# Patient Record
Sex: MALE | Race: BLACK OR AFRICAN AMERICAN | NOT HISPANIC OR LATINO | Employment: FULL TIME | URBAN - METROPOLITAN AREA
[De-identification: names, ages, dates, MRNs, and addresses within clinical notes are randomized per-mention and may not be internally consistent; named-entity substitution may affect disease eponyms.]

---

## 2017-06-05 ENCOUNTER — TELEPHONE (OUTPATIENT)
Dept: INTERNAL MEDICINE | Facility: CLINIC | Age: 42
End: 2017-06-05

## 2017-06-05 NOTE — LETTER
June 5, 2017    Mango Jaramillo Jr.  [address] [facility] [place] [address]             [facility] [place], operated [facility] - Internal Medicine  [address]  [place] [address]  Phone: [phone]  Fax: [phone] Dear Mr. Jaramillo:    We were unable to contact you in regards to scheduling an appointment with  Dr. Ramirez.  Please contact us at [phone] so we can schedule you to see your primary care provider at your earliest convenience.        If you have any questions or concerns, please don't hesitate to call.    Sincerely,        Tess Bullard

## 2018-02-06 ENCOUNTER — PATIENT OUTREACH (OUTPATIENT)
Dept: INTERNAL MEDICINE | Facility: CLINIC | Age: 43
End: 2018-02-06

## 2018-02-06 NOTE — PROGRESS NOTES
[facility] is committed to your overall health.  To help you get the most out of each of your visits, we will review your information to make sure you are up to date on all of your recommended tests and/or procedures.       Your PCP  Everette Ramirez MD   found that you may be due for:       Health Maintenance Due   Topic Date Due    Influenza Vaccine  08/01/2017             If you have had any of the above done at another facility, please bring the records or information with you so that your record at [facility] will be complete.  If you would like to schedule any of these, please contact me.     If you are currently taking medication, please bring it with you to your appointment for review.     Also, if you have any type of Advanced Directives, please bring them with you to your office visit so we may scan them into your chart.       Thank you for Choosing [facility] for your healthcare needs.        Additional Information  If you have questions, you can email [email or web address] or call [phone]  to talk to our [facility] staff. Remember, [facility] is NOT to be used for urgent needs. For medical emergencies, dial 911.

## 2018-03-13 ENCOUNTER — PATIENT MESSAGE (OUTPATIENT)
Dept: INTERNAL MEDICINE | Facility: CLINIC | Age: 43
End: 2018-03-13

## 2018-03-13 ENCOUNTER — LAB VISIT (OUTPATIENT)
Dept: LAB | Facility: OTHER | Age: 43
End: 2018-03-13
Attending: INTERNAL MEDICINE
Payer: COMMERCIAL

## 2018-03-13 ENCOUNTER — OFFICE VISIT (OUTPATIENT)
Dept: INTERNAL MEDICINE | Facility: CLINIC | Age: 43
End: 2018-03-13
Payer: COMMERCIAL

## 2018-03-13 VITALS
HEIGHT: 70 IN | HEART RATE: 71 BPM | BODY MASS INDEX: 28.31 KG/M2 | SYSTOLIC BLOOD PRESSURE: 114 MMHG | WEIGHT: 197.75 LBS | DIASTOLIC BLOOD PRESSURE: 70 MMHG

## 2018-03-13 DIAGNOSIS — H91.93 DECREASED HEARING OF BOTH EARS: ICD-10-CM

## 2018-03-13 DIAGNOSIS — Z00.00 WELLNESS EXAMINATION: Primary | ICD-10-CM

## 2018-03-13 DIAGNOSIS — E78.2 MIXED HYPERLIPIDEMIA: Chronic | ICD-10-CM

## 2018-03-13 DIAGNOSIS — Z00.00 WELLNESS EXAMINATION: ICD-10-CM

## 2018-03-13 DIAGNOSIS — M25.511 CHRONIC RIGHT SHOULDER PAIN: ICD-10-CM

## 2018-03-13 DIAGNOSIS — G89.29 CHRONIC RIGHT SHOULDER PAIN: ICD-10-CM

## 2018-03-13 LAB
ALBUMIN SERPL BCP-MCNC: 4.2 G/DL
ALP SERPL-CCNC: 113 U/L
ALT SERPL W/O P-5'-P-CCNC: 32 U/L
ANION GAP SERPL CALC-SCNC: 8 MMOL/L
AST SERPL-CCNC: 18 U/L
BASOPHILS # BLD AUTO: 0.01 K/UL
BASOPHILS NFR BLD: 0.3 %
BILIRUB SERPL-MCNC: 0.6 MG/DL
BUN SERPL-MCNC: 8 MG/DL
CALCIUM SERPL-MCNC: 9.8 MG/DL
CHLORIDE SERPL-SCNC: 102 MMOL/L
CO2 SERPL-SCNC: 28 MMOL/L
CREAT SERPL-MCNC: 1 MG/DL
DIFFERENTIAL METHOD: ABNORMAL
EOSINOPHIL # BLD AUTO: 0.1 K/UL
EOSINOPHIL NFR BLD: 2.2 %
ERYTHROCYTE [DISTWIDTH] IN BLOOD BY AUTOMATED COUNT: 13 %
EST. GFR  (AFRICAN AMERICAN): >60 ML/MIN/1.73 M^2
EST. GFR  (NON AFRICAN AMERICAN): >60 ML/MIN/1.73 M^2
GLUCOSE SERPL-MCNC: 95 MG/DL
HCT VFR BLD AUTO: 43 %
HGB BLD-MCNC: 14.1 G/DL
LYMPHOCYTES # BLD AUTO: 2 K/UL
LYMPHOCYTES NFR BLD: 53.3 %
MCH RBC QN AUTO: 28.9 PG
MCHC RBC AUTO-ENTMCNC: 32.8 G/DL
MCV RBC AUTO: 88 FL
MONOCYTES # BLD AUTO: 0.3 K/UL
MONOCYTES NFR BLD: 7.4 %
NEUTROPHILS # BLD AUTO: 1.4 K/UL
NEUTROPHILS NFR BLD: 36.8 %
PLATELET # BLD AUTO: 329 K/UL
PMV BLD AUTO: 9.8 FL
POTASSIUM SERPL-SCNC: 4.4 MMOL/L
PROT SERPL-MCNC: 8 G/DL
RBC # BLD AUTO: 4.88 M/UL
SODIUM SERPL-SCNC: 138 MMOL/L
WBC # BLD AUTO: 3.66 K/UL

## 2018-03-13 PROCEDURE — 80053 COMPREHEN METABOLIC PANEL: CPT

## 2018-03-13 PROCEDURE — 85025 COMPLETE CBC W/AUTO DIFF WBC: CPT

## 2018-03-13 PROCEDURE — 99999 PR PBB SHADOW E&M-EST. PATIENT-LVL IV: CPT | Mod: PBBFAC,,, | Performed by: INTERNAL MEDICINE

## 2018-03-13 PROCEDURE — 99396 PREV VISIT EST AGE 40-64: CPT | Mod: S$GLB,,, | Performed by: INTERNAL MEDICINE

## 2018-03-13 PROCEDURE — 36415 COLL VENOUS BLD VENIPUNCTURE: CPT

## 2018-03-13 NOTE — PROGRESS NOTES
Subjective:       Patient ID: Mango Jaramillo Jr. is a 42 y.o. male.    Chief Complaint: Annual Exam    Pt here for annual exam. Feels well. Counseled on exercise goals. He had lipids performed at job and are stable with , , and HDL 49.     Pt c/o R shoulder pain for several months. Hurts to pull covers up or lift something. No weakness. No neck pain. Does have some pain down into R arm but the pain into arm is only the last 3 weeks. No known inciting event/trauma. Not using anything for it.       Review of Systems   Constitutional: Negative for fatigue, fever and unexpected weight change.   HENT: Negative for congestion, rhinorrhea and sore throat.    Eyes: Negative for visual disturbance.   Respiratory: Negative for cough and shortness of breath.    Cardiovascular: Negative for chest pain, palpitations and leg swelling.   Gastrointestinal: Negative for abdominal pain, blood in stool, constipation and diarrhea.   Genitourinary: Negative for difficulty urinating and dysuria.   Skin: Negative for color change and rash.   Neurological: Negative for dizziness and syncope.   Hematological: Negative for adenopathy.   Psychiatric/Behavioral: Negative for dysphoric mood.       Objective:      Physical Exam   Constitutional: He is oriented to person, place, and time. He appears well-developed and well-nourished.   HENT:   Head: Normocephalic and atraumatic.   Right Ear: External ear normal.   Left Ear: External ear normal.   Mouth/Throat: Oropharynx is clear and moist. No oropharyngeal exudate.   Eyes: Conjunctivae and EOM are normal. Pupils are equal, round, and reactive to light.   Neck: Neck supple. Carotid bruit is not present. No thyromegaly present.   Cardiovascular: Normal rate, regular rhythm, S1 normal, S2 normal, normal heart sounds and intact distal pulses.    Pulmonary/Chest: Effort normal and breath sounds normal.   Abdominal: Soft. Bowel sounds are normal. He exhibits no mass. There is no  hepatosplenomegaly. There is no tenderness.   Musculoskeletal: He exhibits no edema.        Right shoulder: He exhibits tenderness. He exhibits normal range of motion and no bony tenderness.        Left shoulder: Normal.        Right knee: Normal.        Left knee: Normal.        Cervical back: Normal.   Pain with resistance to elevation on R shoulder   Lymphadenopathy:     He has no cervical adenopathy.   Neurological: He is alert and oriented to person, place, and time. No cranial nerve deficit.   Psychiatric: He has a normal mood and affect. His behavior is normal.       Assessment:       1. Wellness examination    2. Mixed hyperlipidemia    3. Chronic right shoulder pain    4. Decreased hearing of both ears        Plan:       1. Appropriate labs  2. Ortho referral  3. Audiology referral per pt request due to subjective decrease in hearing chronically   4. Trial of aleve otc 2 tabs bid x 10 days then prn--proper use d/w pt

## 2018-03-28 ENCOUNTER — CLINICAL SUPPORT (OUTPATIENT)
Dept: OTOLARYNGOLOGY | Facility: CLINIC | Age: 43
End: 2018-03-28
Payer: COMMERCIAL

## 2018-03-28 DIAGNOSIS — H90.3 SENSORINEURAL HEARING LOSS, BILATERAL: Primary | ICD-10-CM

## 2018-03-28 PROCEDURE — 92557 COMPREHENSIVE HEARING TEST: CPT | Mod: S$GLB,,, | Performed by: AUDIOLOGIST

## 2018-03-28 PROCEDURE — 92550 TYMPANOMETRY & REFLEX THRESH: CPT | Mod: 52,S$GLB,, | Performed by: AUDIOLOGIST

## 2018-03-28 NOTE — PROGRESS NOTES
Normal to mild sensorineural hearing loss in the left ear.  Normal to moderate sensorineural hearing loss in the right ear.  Hypermobile tympanograms with present reflexes bilaterally.  Recommend otologic evaluation before discussion of hearing aids.

## 2018-04-11 ENCOUNTER — TELEPHONE (OUTPATIENT)
Dept: ORTHOPEDICS | Facility: CLINIC | Age: 43
End: 2018-04-11

## 2018-04-11 DIAGNOSIS — M25.511 RIGHT SHOULDER PAIN, UNSPECIFIED CHRONICITY: Primary | ICD-10-CM

## 2018-04-11 DIAGNOSIS — M79.601 RIGHT ARM PAIN: ICD-10-CM

## 2018-04-11 NOTE — TELEPHONE ENCOUNTER
Mango Jaramillo Jr. reminded of appointment on 4/12/18 with Dr. JOHN Flood w/time and location. Notified of need for xray before OV w/date, time, and location of appts.  Per patient right shoulder and right bicep pain.

## 2018-04-12 ENCOUNTER — OFFICE VISIT (OUTPATIENT)
Dept: ORTHOPEDICS | Facility: CLINIC | Age: 43
End: 2018-04-12
Payer: COMMERCIAL

## 2018-04-12 ENCOUNTER — HOSPITAL ENCOUNTER (OUTPATIENT)
Dept: RADIOLOGY | Facility: OTHER | Age: 43
Discharge: HOME | End: 2018-04-12
Attending: ORTHOPAEDIC SURGERY
Payer: COMMERCIAL

## 2018-04-12 VITALS
WEIGHT: 197.06 LBS | HEART RATE: 63 BPM | DIASTOLIC BLOOD PRESSURE: 83 MMHG | SYSTOLIC BLOOD PRESSURE: 130 MMHG | BODY MASS INDEX: 28.21 KG/M2 | HEIGHT: 70 IN

## 2018-04-12 DIAGNOSIS — S46.211A BICEPS MUSCLE STRAIN, RIGHT, INITIAL ENCOUNTER: ICD-10-CM

## 2018-04-12 DIAGNOSIS — M25.511 RIGHT SHOULDER PAIN, UNSPECIFIED CHRONICITY: ICD-10-CM

## 2018-04-12 DIAGNOSIS — M79.601 RIGHT ARM PAIN: ICD-10-CM

## 2018-04-12 DIAGNOSIS — M25.511 RIGHT SHOULDER PAIN, UNSPECIFIED CHRONICITY: Primary | ICD-10-CM

## 2018-04-12 DIAGNOSIS — M25.611 DECREASED RIGHT SHOULDER RANGE OF MOTION: ICD-10-CM

## 2018-04-12 PROCEDURE — 73060 X-RAY EXAM OF HUMERUS: CPT | Mod: TC,FY,RT

## 2018-04-12 PROCEDURE — 73030 X-RAY EXAM OF SHOULDER: CPT | Mod: TC,FY,RT

## 2018-04-12 PROCEDURE — 99999 PR PBB SHADOW E&M-EST. PATIENT-LVL III: CPT | Mod: PBBFAC,,, | Performed by: PHYSICIAN ASSISTANT

## 2018-04-12 PROCEDURE — 73030 X-RAY EXAM OF SHOULDER: CPT | Mod: 26,RT,, | Performed by: RADIOLOGY

## 2018-04-12 PROCEDURE — 73060 X-RAY EXAM OF HUMERUS: CPT | Mod: 26,RT,, | Performed by: RADIOLOGY

## 2018-04-12 PROCEDURE — 99203 OFFICE O/P NEW LOW 30 MIN: CPT | Mod: S$GLB,,, | Performed by: PHYSICIAN ASSISTANT

## 2018-04-12 NOTE — PROGRESS NOTES
"Subjective:      Patient ID: Mango Jaramillo Jr. is a 42 y.o. male.    Chief Complaint: Pain of the Right Shoulder      HPI  Mango Jaramillo Jr. is a right hand dominant 42 y.o. male presenting today for right shoulder and upper arm pain.  There was not a history of trauma.  Onset of symptoms began 6-9+ months ago.  He reports that his right shoulder pain has been progressive, over the past several months.  He reports 7/10 "sharp sudden" pain that is aggravated by activity such as lifting. He also reports decreased right shoulder motion. He reports difficulty putting on a jacket or pulling the covers up in bed, he also reports difficulty taking his shirt in the back of his pants.  He reports the pain is improved with rest, he is also tried stretching and reports that it does provide a little improvement in his range of motion.  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      Review of patient's allergies indicates:  No Known Allergies      No current outpatient prescriptions on file.     No current facility-administered medications for this visit.        Past Medical History:   Diagnosis Date    Hyperlipidemia     Sciatic nerve pain diagnosed last year       Past Surgical History:   Procedure Laterality Date    APPENDECTOMY      HERNIA REPAIR           Review of Systems:  Review of Systems   Constitution: Negative for chills and fever.   Skin: Negative for rash and suspicious lesions.   Musculoskeletal:        See HPI   Neurological: Negative for dizziness, headaches, light-headedness, numbness and paresthesias.   Psychiatric/Behavioral: Negative for depression. The patient is not nervous/anxious.          OBJECTIVE:     PHYSICAL EXAM:  Height: 5' 10" (177.8 cm) Weight: 89.4 kg (197 lb 1.5 oz)     Vitals:    04/12/18 0958   BP: 130/83   Pulse: 63     General    Vitals reviewed.  Constitutional: He is oriented to person, place, and time. He appears well-developed and well-nourished.   HENT:   Head: " Normocephalic and atraumatic.   Neck: Normal range of motion.   Cardiovascular: Normal rate.    Pulmonary/Chest: Effort normal. No respiratory distress.   Neurological: He is alert and oriented to person, place, and time.   Psychiatric: He has a normal mood and affect. His behavior is normal. Judgment and thought content normal.             Musculoskeletal: No scars, edema, or ecchymosis.  Nontender to palpation.  Decreased range of motion of the right shoulder compared to the left.  Right shoulder with good forward flexion and abduction, mildly decreased ER, moderately decreased IR.  Neurovascularly intact-good sensation and motor function, good capillary refill.    RADIOGRAPHS:  Right Shoulder and Humerus X-Ray, 4/12/18  FINDINGS:  No acute fracture or dislocation.  No destructive osseous lesions.  Acromioclavicular joint is intact.  There is downsloping of the acromion and narrowing of the subacromial space.  The visualized lung is clear.   Impression   Downsloping of the acromion with narrowing of the subacromial space.  No acute findings.     Comments: I have personally reviewed the imaging and I agree with the above radiologist's report.    ASSESSMENT/PLAN:   Mango was seen today for pain.    Diagnoses and all orders for this visit:    Right shoulder pain, unspecified chronicity  -     Ambulatory Referral to Physical/Occupational Therapy    Decreased right shoulder range of motion  -     Ambulatory Referral to Physical/Occupational Therapy    Biceps muscle strain, right, initial encounter  -     Ambulatory Referral to Physical/Occupational Therapy           - We talked at length about the anatomy and pathophysiology of   Encounter Diagnoses   Name Primary?    Right shoulder pain, unspecified chronicity Yes    Decreased right shoulder range of motion     Biceps muscle strain, right, initial encounter        - Discussed conservative and surgical treatment options for shoulder pain.  Patient is not interested  in injections.  - [facility]  - Discussed use of heat and ice, use of NSAIDs  - Follow-up in 8 weeks

## 2018-04-12 NOTE — LETTER
April 15, 2018      Everette Ramirez MD  [address]  [facility] [address]           [facility]  [address]  [facility] [address]  Phone: [phone]          Patient: Mango Jaramillo Jr.   MR Number: [id number]   YOB: 1975   Date of Visit: 4/12/2018       Dear Dr. Everette Ramirez:    Thank you for referring Mango Jaramillo to me for evaluation. Attached you will find relevant portions of my assessment and plan of care.    If you have questions, please do not hesitate to call me. I look forward to following Mango Jaramillo along with you.    Sincerely,        Enclosure  CC:  No Recipients    If you would like to receive this communication electronically, please contact [email or web address] or [phone] to request more information on [employer] Link access.    For providers and/or their staff who would like to refer a patient to [facility], please contact us through our one-stop-shop provider referral line, [facility], at [phone].    If you feel you have received this communication in error or would no longer like to receive these types of communications, please e-mail [email or web address]

## 2018-05-02 ENCOUNTER — OFFICE VISIT (OUTPATIENT)
Dept: OTOLARYNGOLOGY | Facility: CLINIC | Age: 43
End: 2018-05-02
Payer: COMMERCIAL

## 2018-05-02 VITALS
BODY MASS INDEX: 28.2 KG/M2 | WEIGHT: 197 LBS | DIASTOLIC BLOOD PRESSURE: 69 MMHG | SYSTOLIC BLOOD PRESSURE: 105 MMHG | HEART RATE: 64 BPM | HEIGHT: 70 IN

## 2018-05-02 DIAGNOSIS — H83.3X3 ACOUSTIC TRAUMA OF BOTH EARS: ICD-10-CM

## 2018-05-02 DIAGNOSIS — H90.3 ASYMMETRICAL SENSORINEURAL HEARING LOSS: Primary | ICD-10-CM

## 2018-05-02 DIAGNOSIS — Z82.2 FAMILY HISTORY OF HEARING LOSS: ICD-10-CM

## 2018-05-02 PROCEDURE — 99203 OFFICE O/P NEW LOW 30 MIN: CPT | Mod: S$GLB,,, | Performed by: OTOLARYNGOLOGY

## 2018-05-02 NOTE — LETTER
May 2, 2018      Everette Ramirez MD  [address]  [facility] [address]           [facility] Otorhinolaryngology  [address]  [facility] [address]  Phone: [phone]  Fax: [phone]          Patient: Mango Jaramillo Jr.   MR Number: [id number]   YOB: 1975   Date of Visit: 5/2/2018       Dear Dr. Everette Ramirez:    Thank you for referring Mango Jaramillo to me for evaluation. Attached you will find relevant portions of my assessment and plan of care.    If you have questions, please do not hesitate to call me. I look forward to following Mango Jaramillo along with you.    Sincerely,    Nayeli Gardner MD    Enclosure  CC:  No Recipients    If you would like to receive this communication electronically, please contact [email or web address] or [phone] to request more information on [employer] Link access.    For providers and/or their staff who would like to refer a patient to [facility], please contact us through our one-stop-shop provider referral line, [facility] [place], at [phone].    If you feel you have received this communication in error or would no longer like to receive these types of communications, please e-mail [email or web address]

## 2018-05-03 NOTE — PROGRESS NOTES
Subjective:       Patient ID: Mango Jaramillo Jr. is a 43 y.o. male.    Chief Complaint: Hearing Loss    He is a new patient for me here today complaining of difficulty hearing with gradual onset over the past 1-2 years.  He states [place] as his second job and wears earmuffs for protection.  He also occasionally uses a drill during the course of his day job.  He gives a history of one set of PE tubes as a young child.  He is not aware of any difficulty hearing during his school age years or ear infections as an adult.  He flew in November without ear complaints but also reports flying to [place] a couple years ago with some difficulty clearing his ears for several hours after landing.  He denies nasal congestion or hayfever symptoms or frequent URI.  He reports a family history of hearing loss in his father who wore hearing aids as long as the patient can remember.  He is unclear as to the nature or cause of his father's hearing loss.  He denies other family members with ear or hearing problems.  There are no other otolaryngologic complaints.  He recently underwent audiologic testing and was referred due to hearing loss with some asymmetry.          Review of Systems   Ears: Positive for hearing loss and family history of hearing loss.  Negative for ear pain, ear pressure, ringing in ear, ear discharge, ear infections, dizziness, head trauma and taken gentramycin/streptomycin.    Nose:  Negative for nosebleeds, nasal obstruction, nasal or sinus surgery, loss of smell, postnasal drip and snoring.    Mouth/Throat: Negative for pain swallowing, impaired swallowing, hoarse voice, throat mass, neck mass, oral ulcers and neck lumps.   Constitutional: Negative for recent unexplained weight loss, fever, chills and night sweats.    Eyes:  Negative for history of glaucoma.   Cardiovascular:  Negative for chest pain, palpitations and history of high blood pressure.   Respiratory:  Negative for asthma,  emphysema, history of tuberculosis, recent cough and shortness of breath.    Gastrointestinal:  Negative for history of stomach ulcers or pain, acid reflux, indigestion, blood in stool and change in stool.   Other:  Negative for kidney problem, bladder problem, prostate disease, arthritis, new or changing moles, weakness, disturbances in coordination, slurred, confusion, swollen glands, anemia and persistent infections.           Objective:        Vitals:    05/02/18 1111   BP: 105/69   Pulse: 64     Body mass index is 28.27 kg/m².  Physical Exam   Constitutional: He is oriented to person, place, and time. He appears well-developed and well-nourished. No distress.   HENT:   Head: Normocephalic and atraumatic.   Right Ear: Tympanic membrane, external ear and ear canal normal.   Left Ear: Tympanic membrane, external ear and ear canal normal.   Nose: No mucosal edema, rhinorrhea or nasal deformity. No epistaxis.   Mouth/Throat: Uvula is midline, oropharynx is clear and moist and mucous membranes are normal. No oral lesions. No trismus in the jaw. No uvula swelling. No oropharyngeal exudate, posterior oropharyngeal edema or posterior oropharyngeal erythema.   Eyes: Conjunctivae are normal. Right eye exhibits no discharge. Left eye exhibits no discharge. No scleral icterus.   Neck: Normal range of motion and phonation normal. Neck supple. No tracheal deviation present. No thyromegaly present.   Pulmonary/Chest: Effort normal. No stridor. No respiratory distress.   Musculoskeletal: Normal range of motion. He exhibits no edema or deformity.   Lymphadenopathy:     He has no cervical adenopathy.   Neurological: He is alert and oriented to person, place, and time. He displays no weakness. Coordination normal.   Skin: Skin is warm and dry. He is not diaphoretic.   Psychiatric: He has a normal mood and affect. His behavior is normal. His speech is not slurred.       Tests / Results:                Assessment:       1.  Asymmetrical sensorineural hearing loss    2. Acoustic trauma of both ears    3. Family history of hearing loss        Plan:       Reviewed above history and exam and audiogram with patient and considerations and recommendations.  MRI discussed / ordered.  Reviewed and understands hearing protection.  Follow-up after above in the next few weeks.

## 2019-05-03 ENCOUNTER — LAB VISIT (OUTPATIENT)
Dept: LAB | Facility: OTHER | Age: 44
End: 2019-05-03
Attending: INTERNAL MEDICINE
Payer: COMMERCIAL

## 2019-05-03 ENCOUNTER — OFFICE VISIT (OUTPATIENT)
Dept: INTERNAL MEDICINE | Facility: CLINIC | Age: 44
End: 2019-05-03
Payer: COMMERCIAL

## 2019-05-03 VITALS
SYSTOLIC BLOOD PRESSURE: 110 MMHG | WEIGHT: 188.94 LBS | BODY MASS INDEX: 27.05 KG/M2 | HEART RATE: 68 BPM | HEIGHT: 70 IN | DIASTOLIC BLOOD PRESSURE: 70 MMHG

## 2019-05-03 DIAGNOSIS — Z00.00 WELLNESS EXAMINATION: ICD-10-CM

## 2019-05-03 DIAGNOSIS — E78.2 MIXED HYPERLIPIDEMIA: Chronic | ICD-10-CM

## 2019-05-03 DIAGNOSIS — Z00.00 WELLNESS EXAMINATION: Primary | ICD-10-CM

## 2019-05-03 LAB
ALBUMIN SERPL BCP-MCNC: 3.9 G/DL (ref 3.5–5.2)
ALP SERPL-CCNC: 99 U/L (ref 55–135)
ALT SERPL W/O P-5'-P-CCNC: 31 U/L (ref 10–44)
ANION GAP SERPL CALC-SCNC: 9 MMOL/L (ref 8–16)
AST SERPL-CCNC: 17 U/L (ref 10–40)
BASOPHILS # BLD AUTO: 0.03 K/UL (ref 0–0.2)
BASOPHILS NFR BLD: 0.9 % (ref 0–1.9)
BILIRUB SERPL-MCNC: 0.5 MG/DL (ref 0.1–1)
BUN SERPL-MCNC: 11 MG/DL (ref 6–20)
CALCIUM SERPL-MCNC: 9.6 MG/DL (ref 8.7–10.5)
CHLORIDE SERPL-SCNC: 105 MMOL/L (ref 95–110)
CHOLEST SERPL-MCNC: 230 MG/DL (ref 120–199)
CHOLEST/HDLC SERPL: 4.5 {RATIO} (ref 2–5)
CO2 SERPL-SCNC: 27 MMOL/L (ref 23–29)
CREAT SERPL-MCNC: 0.9 MG/DL (ref 0.5–1.4)
DIFFERENTIAL METHOD: ABNORMAL
EOSINOPHIL # BLD AUTO: 0.1 K/UL (ref 0–0.5)
EOSINOPHIL NFR BLD: 1.7 % (ref 0–8)
ERYTHROCYTE [DISTWIDTH] IN BLOOD BY AUTOMATED COUNT: 12.9 % (ref 11.5–14.5)
EST. GFR  (AFRICAN AMERICAN): >60 ML/MIN/1.73 M^2
EST. GFR  (NON AFRICAN AMERICAN): >60 ML/MIN/1.73 M^2
GLUCOSE SERPL-MCNC: 83 MG/DL (ref 70–110)
HCT VFR BLD AUTO: 42.8 % (ref 40–54)
HDLC SERPL-MCNC: 51 MG/DL (ref 40–75)
HDLC SERPL: 22.2 % (ref 20–50)
HGB BLD-MCNC: 14.1 G/DL (ref 14–18)
LDLC SERPL CALC-MCNC: 152.6 MG/DL (ref 63–159)
LYMPHOCYTES # BLD AUTO: 1.7 K/UL (ref 1–4.8)
LYMPHOCYTES NFR BLD: 49.9 % (ref 18–48)
MCH RBC QN AUTO: 28.6 PG (ref 27–31)
MCHC RBC AUTO-ENTMCNC: 32.9 G/DL (ref 32–36)
MCV RBC AUTO: 87 FL (ref 82–98)
MONOCYTES # BLD AUTO: 0.3 K/UL (ref 0.3–1)
MONOCYTES NFR BLD: 7.6 % (ref 4–15)
NEUTROPHILS # BLD AUTO: 1.4 K/UL (ref 1.8–7.7)
NEUTROPHILS NFR BLD: 39.6 % (ref 38–73)
NONHDLC SERPL-MCNC: 179 MG/DL
PLATELET # BLD AUTO: 309 K/UL (ref 150–350)
PMV BLD AUTO: 9.7 FL (ref 9.2–12.9)
POTASSIUM SERPL-SCNC: 4 MMOL/L (ref 3.5–5.1)
PROT SERPL-MCNC: 7.6 G/DL (ref 6–8.4)
RBC # BLD AUTO: 4.93 M/UL (ref 4.6–6.2)
SODIUM SERPL-SCNC: 141 MMOL/L (ref 136–145)
TRIGL SERPL-MCNC: 132 MG/DL (ref 30–150)
WBC # BLD AUTO: 3.43 K/UL (ref 3.9–12.7)

## 2019-05-03 PROCEDURE — 99396 PR PREVENTIVE VISIT,EST,40-64: ICD-10-PCS | Mod: S$GLB,,, | Performed by: INTERNAL MEDICINE

## 2019-05-03 PROCEDURE — 80061 LIPID PANEL: CPT

## 2019-05-03 PROCEDURE — 99396 PREV VISIT EST AGE 40-64: CPT | Mod: S$GLB,,, | Performed by: INTERNAL MEDICINE

## 2019-05-03 PROCEDURE — 80053 COMPREHEN METABOLIC PANEL: CPT

## 2019-05-03 PROCEDURE — 85025 COMPLETE CBC W/AUTO DIFF WBC: CPT

## 2019-05-03 PROCEDURE — 99999 PR PBB SHADOW E&M-EST. PATIENT-LVL III: ICD-10-PCS | Mod: PBBFAC,,, | Performed by: INTERNAL MEDICINE

## 2019-05-03 PROCEDURE — 36415 COLL VENOUS BLD VENIPUNCTURE: CPT

## 2019-05-03 PROCEDURE — 99999 PR PBB SHADOW E&M-EST. PATIENT-LVL III: CPT | Mod: PBBFAC,,, | Performed by: INTERNAL MEDICINE

## 2019-05-03 NOTE — PROGRESS NOTES
Subjective:       Patient ID: Mango Jaramillo Jr. is a 44 y.o. male.    Chief Complaint: Annual Exam    Pt here for annual exam. Counseled on exercise goals.          Review of Systems   Constitutional: Negative for fatigue, fever and unexpected weight change.   HENT: Negative for congestion, rhinorrhea and sore throat.    Eyes: Negative for visual disturbance.   Respiratory: Negative for cough and shortness of breath.    Cardiovascular: Negative for chest pain, palpitations and leg swelling.   Gastrointestinal: Negative for abdominal pain, blood in stool, constipation and diarrhea.   Genitourinary: Negative for difficulty urinating and dysuria.   Skin: Negative for color change and rash.   Neurological: Negative for dizziness and syncope.   Hematological: Negative for adenopathy.   Psychiatric/Behavioral: Negative for dysphoric mood.       Objective:      Physical Exam   Constitutional: He is oriented to person, place, and time. He appears well-developed and well-nourished.   HENT:   Head: Normocephalic and atraumatic.   Right Ear: External ear normal.   Left Ear: External ear normal.   Mouth/Throat: Oropharynx is clear and moist. No oropharyngeal exudate.   Eyes: Pupils are equal, round, and reactive to light. Conjunctivae and EOM are normal.   Neck: Neck supple. Carotid bruit is not present. No thyromegaly present.   Cardiovascular: Normal rate, regular rhythm, S1 normal, S2 normal, normal heart sounds and intact distal pulses.   Pulmonary/Chest: Effort normal and breath sounds normal.   Abdominal: Soft. Bowel sounds are normal. He exhibits no mass. There is no hepatosplenomegaly. There is no tenderness.   Musculoskeletal: He exhibits no edema.   Lymphadenopathy:     He has no cervical adenopathy.   Neurological: He is alert and oriented to person, place, and time. No cranial nerve deficit.   Psychiatric: He has a normal mood and affect. His behavior is normal.       Assessment:       1. Wellness  examination    2. Mixed hyperlipidemia        Plan:       1. Appropriate labs

## 2019-05-16 ENCOUNTER — OFFICE VISIT (OUTPATIENT)
Dept: PODIATRY | Facility: CLINIC | Age: 44
End: 2019-05-16
Payer: COMMERCIAL

## 2019-05-16 ENCOUNTER — PATIENT MESSAGE (OUTPATIENT)
Dept: PODIATRY | Facility: CLINIC | Age: 44
End: 2019-05-16

## 2019-05-16 VITALS
DIASTOLIC BLOOD PRESSURE: 69 MMHG | HEART RATE: 64 BPM | WEIGHT: 191.81 LBS | BODY MASS INDEX: 27.46 KG/M2 | HEIGHT: 70 IN | SYSTOLIC BLOOD PRESSURE: 104 MMHG | RESPIRATION RATE: 18 BRPM

## 2019-05-16 DIAGNOSIS — M72.2 PLANTAR FASCIITIS: Primary | ICD-10-CM

## 2019-05-16 PROCEDURE — 99999 PR PBB SHADOW E&M-EST. PATIENT-LVL III: ICD-10-PCS | Mod: PBBFAC,,, | Performed by: PODIATRIST

## 2019-05-16 PROCEDURE — 99999 PR PBB SHADOW E&M-EST. PATIENT-LVL III: CPT | Mod: PBBFAC,,, | Performed by: PODIATRIST

## 2019-05-16 PROCEDURE — 99203 OFFICE O/P NEW LOW 30 MIN: CPT | Mod: S$GLB,,, | Performed by: PODIATRIST

## 2019-05-16 PROCEDURE — 99203 PR OFFICE/OUTPT VISIT, NEW, LEVL III, 30-44 MIN: ICD-10-PCS | Mod: S$GLB,,, | Performed by: PODIATRIST

## 2019-05-16 NOTE — LETTER
May 16, 2019      Mango Jaramillo  [address] [facility] [place] [address]             [facility] - Podiatry  [address]  [facility] [address]  Phone: [phone] Patient: Mango Jaramillo  MRN: [id number]  YOB: 1975  Date of Visit: 05/16/2019    To Whom It May Concern:    Mango Swanson was seen in the Podiatry clinic on 05/16/2019. He may return to work/school on 5/16/19 with no restrictions. If you have any questions or concerns, or if I can be of further assistance, please do not hesitate to contact my office.    Sincerely,          Viry Garcia DPM  Podiatry Clinic  [facility]

## 2019-05-20 NOTE — PROGRESS NOTES
Subjective:      Patient ID: Mango Jaramillo Jr. is a 44 y.o. male.    Chief Complaint: PCP (Everette Ramirez ); Foot Problem (both feet ); Foot Pain (RT is worse Arch ); and Heel Pain    Mango is a 44 y.o. male who presents to the clinic complaining of heel pain in both feet, especially with the first step in the morning. The pain is described as Dull and Deep. The onset of the pain was gradual and has significantly improved over the past several days. He denies a history of trauma. Prior treatments include stretsching.      Review of Systems   Constitution: Negative for chills, decreased appetite and fever.   Cardiovascular: Negative for leg swelling.   Musculoskeletal: Negative for arthritis, joint pain, joint swelling and myalgias.   Gastrointestinal: Negative for nausea and vomiting.   Neurological: Negative for loss of balance, numbness and paresthesias.           Patient Active Problem List   Diagnosis    Mixed hyperlipidemia       No current outpatient medications on file prior to visit.     No current facility-administered medications on file prior to visit.        Review of patient's allergies indicates:  No Known Allergies    Past Surgical History:   Procedure Laterality Date    APPENDECTOMY      HERNIA REPAIR         Family History   Problem Relation Age of Onset    Heart disease Father         CABG    Heart disease Mother         CHF       Social History     Socioeconomic History    Marital status:      Spouse name: Not on file    Number of children: Not on file    Years of education: Not on file    Highest education level: Not on file   Occupational History    Not on file   Social Needs    Financial resource strain: Not on file    Food insecurity:     Worry: Not on file     Inability: Not on file    Transportation needs:     Medical: Not on file     Non-medical: Not on file   Tobacco Use    Smoking status: Never Smoker    Smokeless tobacco: Never Used   Substance and  "Sexual Activity    Alcohol use: No    Drug use: No    Sexual activity: Yes     Partners: Female   Lifestyle    Physical activity:     Days per week: Not on file     Minutes per session: Not on file    Stress: Not on file   Relationships    Social connections:     Talks on phone: Not on file     Gets together: Not on file     Attends [employer] service: Not on file     Active member of club or organization: Not on file     Attends meetings of clubs or organizations: Not on file     Relationship status: Not on file   Other Topics Concern    Not on file   Social History Narrative    Not on file               Objective:       Vitals:    05/16/19 1101   BP: 104/69   Pulse: 64   Resp: 18   Weight: 87 kg (191 lb 12.8 oz)   Height: 5' 10" (1.778 m)   PainSc: 0-No pain        Physical Exam   Constitutional: He is oriented to person, place, and time. He appears well-developed and well-nourished.   Cardiovascular: Intact distal pulses.   dorsalis pedis and posterior tibial pulses are palpable bilaterally. Capillary refill time is within normal limits. + pedal hair growth          Musculoskeletal: Normal range of motion. He exhibits tenderness (b/l feet R>L). He exhibits no edema.   Adequate joint range of motion without pain, limitation, nor crepitation Bilateral feet and ankle joints. Muscle strength is 5/5 in all groups bilaterally.         Pain on palpation of b/l  plantar heel consistent with location of patient's chief complaint. Negative Tinel's sign. No pain with lateral compression of heels.     Neurological: He is alert and oriented to person, place, and time. He has normal strength. No sensory deficit.   Loda-Haley 5.07 monofilament is intact bilateral feet.      Skin: Skin is warm, dry and intact. No lesion and no rash noted. No erythema.   Psychiatric: He has a normal mood and affect. His behavior is normal.   Vitals reviewed.            Assessment:       Encounter Diagnosis   Name Primary?    Plantar " fasciitis Yes         Plan:       Mango was seen today for pcp, foot problem, foot pain and heel pain.    Diagnoses and all orders for this visit:    Plantar fasciitis  -     ORTHOTIC DEVICE (DME)      I counseled the patient on his conditions, their implications and medical management.      Discussed different treatment options for heel pain. including conservative and interventional.  I gave written and verbal instructions on heel cord stretching and this was demonstrated for the patient. Patient expressed understanding. Discussed wearing appropriate shoe gear and avoiding flats, slippers, sandals, barefoot, and sockfeet. Recommended arch supports. My recommendation for OTC supports is Spenco polysorb replacement insoles or patient may elect more aggressive treatment with prescription arch supports. We also discussed cortisone injections and NSAID therapy.    Patient instructed on adequate icing techniques. Patient should ice the affected area at least once per day x 10 minutes for 10 days . I advised the  patient that extra icing would also be beneficial to ensure adequate anti inflammatory effect     Stretching handout dispensed to patient. Instructions on adequate stretching reviewed in clinic      Rx Custom orthotics   .

## 2019-06-06 ENCOUNTER — TELEPHONE (OUTPATIENT)
Dept: PODIATRY | Facility: CLINIC | Age: 44
End: 2019-06-06

## 2019-06-06 NOTE — TELEPHONE ENCOUNTER
----- Message from Jada Irizarry sent at 6/6/2019 11:54 AM CDT -----  Contact: [employer]  Needs Advice    Reason for call: Pt's claim have been resubmitted for processing visit 5/16        Communication Preference: Phone     Additional Information: n/a

## 2019-06-06 NOTE — TELEPHONE ENCOUNTER
Patient has yet to get a BMP prior to her follow up with Dr. Ayers on 7/9/2018. Lab order faxed over to [facility] with directions to fax results.    Spoke with patient they didn;t know what else the insurance co needs I advise they will contact our office if any thing further is needed

## 2020-03-26 ENCOUNTER — PATIENT MESSAGE (OUTPATIENT)
Dept: INTERNAL MEDICINE | Facility: CLINIC | Age: 45
End: 2020-03-26

## 2020-03-26 NOTE — TELEPHONE ENCOUNTER
Patient not taking anything for headache and cough. No fever. Patient not taking anything out of fear for making the COVID19 virus worse if he has it.

## 2020-03-30 ENCOUNTER — PATIENT MESSAGE (OUTPATIENT)
Dept: ADMINISTRATIVE | Facility: HOSPITAL | Age: 45
End: 2020-03-30

## 2020-03-31 ENCOUNTER — PATIENT MESSAGE (OUTPATIENT)
Dept: INTERNAL MEDICINE | Facility: CLINIC | Age: 45
End: 2020-03-31

## 2020-03-31 ENCOUNTER — OFFICE VISIT (OUTPATIENT)
Dept: INTERNAL MEDICINE | Facility: CLINIC | Age: 45
End: 2020-03-31
Payer: COMMERCIAL

## 2020-03-31 DIAGNOSIS — R05.9 COUGH: Primary | ICD-10-CM

## 2020-03-31 PROCEDURE — 99213 PR OFFICE/OUTPT VISIT, EST, LEVL III, 20-29 MIN: ICD-10-PCS | Mod: 95,,, | Performed by: FAMILY MEDICINE

## 2020-03-31 PROCEDURE — 99213 OFFICE O/P EST LOW 20 MIN: CPT | Mod: 95,,, | Performed by: FAMILY MEDICINE

## 2020-03-31 NOTE — PROGRESS NOTES
The antibiotic Subjective:       Patient ID: Mango Jaramillo Jr. is a 44 y.o. male.    Chief Complaint: No chief complaint on file.    HPI  45 y/o male here with cough.    He has had a cough for the past week, he reports he lost his sense of smell on Sat and decreased taste. His cough is mostly dry sometime productive of clear sputum, a mild headache denies sinus sx/sob/f/body aches, he has had a little nausea, he denies d/abdominal pain/cp/urinary sx. He is sleeping ok. He took dayquil yesterday which was helpful, he took Ibuprofen 2 days ago. [place] , no known sick contacts.  He has been staying home since Sunday. He is active and staying busy.     Review of Systems  see HPI  Objective:      There were no vitals taken for this visit.  Physical Exam   Constitutional: He is oriented to person, place, and time. He appears well-developed and well-nourished. No distress.   HENT:   Head: Normocephalic and atraumatic.   Pulmonary/Chest: No respiratory distress.   Neurological: He is alert and oriented to person, place, and time.   Skin: He is not diaphoretic.   Psychiatric: He has a normal mood and affect.       Assessment:       1. Cough        Plan:   Diagnoses and all orders for this visit:    Cough    We will discussed that he did not meet the [facility] criteria for COVID-19 testing at this time.  He will try to get tested at [facility] or [facility]. He will let us know if anything changes.  For    The patient location is: [place]  The chief complaint leading to consultation is: cough  Visit type: Virtual visit with synchronous audio and video  Total time spent with patient: 15 min  Each patient to whom he or she provides medical services by telemedicine is:  (1) informed of the relationship between the physician and patient and the respective role of any other health care provider with respect to management of the patient; and (2) notified that he or she may decline to receive  medical services by telemedicine and may withdraw from such care at any time.

## 2020-04-08 ENCOUNTER — PATIENT MESSAGE (OUTPATIENT)
Dept: INTERNAL MEDICINE | Facility: CLINIC | Age: 45
End: 2020-04-08

## 2020-04-08 NOTE — TELEPHONE ENCOUNTER
Per Dr. Ramirez, the patient can try calling the public [facility] office at [phone].  Their office hours are Mon-Fri 8am-4:30pm.  Otherwise, I do not know how to obtain any record of this result myself.

## 2020-04-08 NOTE — TELEPHONE ENCOUNTER
Patients job is requesting a record of hs positive result for COVID-19. Can we create a letter for him, or do you know of a way for him to get his results from the NON-[facility] testing site.   Please advise

## 2020-04-15 ENCOUNTER — OFFICE VISIT (OUTPATIENT)
Dept: INTERNAL MEDICINE | Facility: CLINIC | Age: 45
End: 2020-04-15
Payer: COMMERCIAL

## 2020-04-15 ENCOUNTER — PATIENT MESSAGE (OUTPATIENT)
Dept: INTERNAL MEDICINE | Facility: CLINIC | Age: 45
End: 2020-04-15

## 2020-04-15 ENCOUNTER — TELEPHONE (OUTPATIENT)
Dept: INTERNAL MEDICINE | Facility: CLINIC | Age: 45
End: 2020-04-15

## 2020-04-15 DIAGNOSIS — B34.2 CORONAVIRUS INFECTION: Primary | ICD-10-CM

## 2020-04-15 DIAGNOSIS — R05.9 COUGH: ICD-10-CM

## 2020-04-15 PROCEDURE — 99213 OFFICE O/P EST LOW 20 MIN: CPT | Mod: 95,,, | Performed by: FAMILY MEDICINE

## 2020-04-15 PROCEDURE — 99213 PR OFFICE/OUTPT VISIT, EST, LEVL III, 20-29 MIN: ICD-10-PCS | Mod: 95,,, | Performed by: FAMILY MEDICINE

## 2020-04-15 RX ORDER — BENZONATATE 200 MG/1
200 CAPSULE ORAL 3 TIMES DAILY PRN
Qty: 30 CAPSULE | Refills: 0 | Status: SHIPPED | OUTPATIENT
Start: 2020-04-15 | End: 2020-04-25

## 2020-04-15 NOTE — PROGRESS NOTES
Subjective:       Patient ID: Mango Jaramillo Jr. is a 44 y.o. male.    Chief Complaint: No chief complaint on file.    HPI  43 y/o male here to follow up with cough, he tested positive for Coronavirus on 4/2 at [facility], symptoms started 3/23/20.     He is feeling a little better, his cough has improved some, its mostly dry, his appetite has improved, his sense of smell and taste has improved. He is less tired overall. His headaches have improved, no headache over the past few days, he is not taking any otc medication, he denies f/n/v/d/constipation/cp/sob/urinary sx. He feels he coughs more when he is having a long conversation. He is sleeping ok. He is trying to get up and walk around, he is doing some walks outside. He is working from home for [employer], [place], he will need a work note.    Review of Systems  see HPI  Objective:      There were no vitals taken for this visit.  Physical Exam   Constitutional: He is oriented to person, place, and time. He appears well-developed and well-nourished. No distress.   HENT:   Head: Normocephalic and atraumatic.   Pulmonary/Chest: No respiratory distress.   Neurological: He is alert and oriented to person, place, and time.   Skin: He is not diaphoretic.   Psychiatric: He has a normal mood and affect.       Assessment:       1. Coronavirus infection    2. Cough        Plan:   Diagnoses and all orders for this visit:    Coronavirus infection  -     benzonatate (TESSALON) 200 MG capsule; Take 1 capsule (200 mg total) by mouth 3 (three) times daily as needed for Cough.  -     COVID-19 Respiratory Check; Future    Cough  -     benzonatate (TESSALON) 200 MG capsule; Take 1 capsule (200 mg total) by mouth 3 (three) times daily as needed for Cough.  -     COVID-19 Respiratory Check; Future    follow up with PCP in late May with labs prior    The patient location is: [place]  The chief complaint leading to consultation is: cough  Visit type:  audiovisual  Total time spent with patient: 15 min  Each patient to whom he or she provides medical services by telemedicine is:  (1) informed of the relationship between the physician and patient and the respective role of any other health care provider with respect to management of the patient; and (2) notified that he or she may decline to receive medical services by telemedicine and may withdraw from such care at any time.

## 2020-04-15 NOTE — TELEPHONE ENCOUNTER
Tried to reach pt, but got no answer and VM was not set up. Need to schedule pt appt with pcp in late May and schedule respiratory check. Also need to do work note for pt 3 jobs and mail them to him.

## 2020-04-15 NOTE — LETTER
April 15, 2020      [facility]  [address], [address]  [place] [address]  Phone: [phone]  Fax: [phone]       Patient: Mango Jaramillo   YOB: 1975  Date of Visit: 04/15/2020     Mango Jaramillo Jr. was in contact with/seen in my office on 04/15/2020. COVID-19 is present in our communities across the state. There is limited testing for COVID at this time, so not all patients can be tested. In this situation, your employee meets the following criteria:     Mango Jaramillo Jr. has met the criteria for COVID-19 testing and has a POSITIVE result. He should self-isolate until the followin days after onset of symptoms,   AND   Symptom improvement,   AND   72 hours without fever (and not taking fever-reducing medication)       He may return to work/school on 20 with no restrictions. If you have any questions or concerns, or if I can be of further assistance, please do not hesitate to contact me.    Sincerely,    Jeri Mejia MD

## 2020-04-15 NOTE — TELEPHONE ENCOUNTER
I spoke with patient and scheduled vitals check and follow up with PCP. Patient is okay with having return to work note sent through patient portal. Please advise when it is okay for him to return to work/send letter to patient. Pt requesting Sunday or later.

## 2020-04-15 NOTE — TELEPHONE ENCOUNTER
----- Message from Ernesto Moser sent at 4/15/2020 11:52 AM CDT -----  Contact: CRIS OLIVIA JR. [[id number]]  Type:  Patient Returning Call    Who Called: CRIS OLIVIA JR. [[id number]]    Who Left Message for Patient: Ema    Does the patient know what this is regarding?: yes    Would the patient rather a call back or a response via [facility]?  call    Best Call Back Number: [phone]    Additional Information:

## 2020-04-29 ENCOUNTER — PATIENT MESSAGE (OUTPATIENT)
Dept: INTERNAL MEDICINE | Facility: CLINIC | Age: 45
End: 2020-04-29

## 2020-04-30 ENCOUNTER — PATIENT MESSAGE (OUTPATIENT)
Dept: INTERNAL MEDICINE | Facility: CLINIC | Age: 45
End: 2020-04-30

## 2020-05-27 ENCOUNTER — OFFICE VISIT (OUTPATIENT)
Dept: INTERNAL MEDICINE | Facility: CLINIC | Age: 45
End: 2020-05-27
Payer: COMMERCIAL

## 2020-05-27 ENCOUNTER — LAB VISIT (OUTPATIENT)
Dept: LAB | Facility: OTHER | Age: 45
End: 2020-05-27
Attending: INTERNAL MEDICINE
Payer: COMMERCIAL

## 2020-05-27 VITALS
WEIGHT: 181.19 LBS | HEART RATE: 73 BPM | OXYGEN SATURATION: 98 % | BODY MASS INDEX: 25.94 KG/M2 | HEIGHT: 70 IN | DIASTOLIC BLOOD PRESSURE: 70 MMHG | SYSTOLIC BLOOD PRESSURE: 104 MMHG

## 2020-05-27 DIAGNOSIS — Z00.00 WELLNESS EXAMINATION: Primary | ICD-10-CM

## 2020-05-27 DIAGNOSIS — Z00.00 WELLNESS EXAMINATION: ICD-10-CM

## 2020-05-27 PROBLEM — B34.2 CORONAVIRUS INFECTION: Status: RESOLVED | Noted: 2020-04-15 | Resolved: 2020-05-27

## 2020-05-27 LAB
ALBUMIN SERPL BCP-MCNC: 4 G/DL (ref 3.5–5.2)
ALP SERPL-CCNC: 81 U/L (ref 55–135)
ALT SERPL W/O P-5'-P-CCNC: 38 U/L (ref 10–44)
ANION GAP SERPL CALC-SCNC: 10 MMOL/L (ref 8–16)
AST SERPL-CCNC: 20 U/L (ref 10–40)
BASOPHILS # BLD AUTO: 0.03 K/UL (ref 0–0.2)
BASOPHILS NFR BLD: 0.8 % (ref 0–1.9)
BILIRUB SERPL-MCNC: 0.7 MG/DL (ref 0.1–1)
BUN SERPL-MCNC: 12 MG/DL (ref 6–20)
CALCIUM SERPL-MCNC: 9.1 MG/DL (ref 8.7–10.5)
CHLORIDE SERPL-SCNC: 105 MMOL/L (ref 95–110)
CHOLEST SERPL-MCNC: 239 MG/DL (ref 120–199)
CHOLEST/HDLC SERPL: 3.9 {RATIO} (ref 2–5)
CO2 SERPL-SCNC: 24 MMOL/L (ref 23–29)
CREAT SERPL-MCNC: 1 MG/DL (ref 0.5–1.4)
DIFFERENTIAL METHOD: ABNORMAL
EOSINOPHIL # BLD AUTO: 0.1 K/UL (ref 0–0.5)
EOSINOPHIL NFR BLD: 3.1 % (ref 0–8)
ERYTHROCYTE [DISTWIDTH] IN BLOOD BY AUTOMATED COUNT: 13.9 % (ref 11.5–14.5)
EST. GFR  (AFRICAN AMERICAN): >60 ML/MIN/1.73 M^2
EST. GFR  (NON AFRICAN AMERICAN): >60 ML/MIN/1.73 M^2
GLUCOSE SERPL-MCNC: 93 MG/DL (ref 70–110)
HCT VFR BLD AUTO: 40.8 % (ref 40–54)
HDLC SERPL-MCNC: 61 MG/DL (ref 40–75)
HDLC SERPL: 25.5 % (ref 20–50)
HGB BLD-MCNC: 13.2 G/DL (ref 14–18)
HIV 1+2 AB+HIV1 P24 AG SERPL QL IA: NEGATIVE
IMM GRANULOCYTES # BLD AUTO: 0.01 K/UL (ref 0–0.04)
IMM GRANULOCYTES NFR BLD AUTO: 0.3 % (ref 0–0.5)
LDLC SERPL CALC-MCNC: 152.4 MG/DL (ref 63–159)
LYMPHOCYTES # BLD AUTO: 2.1 K/UL (ref 1–4.8)
LYMPHOCYTES NFR BLD: 54 % (ref 18–48)
MCH RBC QN AUTO: 28.9 PG (ref 27–31)
MCHC RBC AUTO-ENTMCNC: 32.4 G/DL (ref 32–36)
MCV RBC AUTO: 90 FL (ref 82–98)
MONOCYTES # BLD AUTO: 0.4 K/UL (ref 0.3–1)
MONOCYTES NFR BLD: 9.6 % (ref 4–15)
NEUTROPHILS # BLD AUTO: 1.3 K/UL (ref 1.8–7.7)
NEUTROPHILS NFR BLD: 32.2 % (ref 38–73)
NONHDLC SERPL-MCNC: 178 MG/DL
NRBC BLD-RTO: 0 /100 WBC
PLATELET # BLD AUTO: 274 K/UL (ref 150–350)
PMV BLD AUTO: 9.4 FL (ref 9.2–12.9)
POTASSIUM SERPL-SCNC: 3.9 MMOL/L (ref 3.5–5.1)
PROT SERPL-MCNC: 7.4 G/DL (ref 6–8.4)
RBC # BLD AUTO: 4.56 M/UL (ref 4.6–6.2)
SODIUM SERPL-SCNC: 139 MMOL/L (ref 136–145)
TRIGL SERPL-MCNC: 128 MG/DL (ref 30–150)
WBC # BLD AUTO: 3.87 K/UL (ref 3.9–12.7)

## 2020-05-27 PROCEDURE — 99999 PR PBB SHADOW E&M-EST. PATIENT-LVL III: CPT | Mod: PBBFAC,,, | Performed by: INTERNAL MEDICINE

## 2020-05-27 PROCEDURE — 86703 HIV-1/HIV-2 1 RESULT ANTBDY: CPT

## 2020-05-27 PROCEDURE — 99999 PR PBB SHADOW E&M-EST. PATIENT-LVL III: ICD-10-PCS | Mod: PBBFAC,,, | Performed by: INTERNAL MEDICINE

## 2020-05-27 PROCEDURE — 80053 COMPREHEN METABOLIC PANEL: CPT

## 2020-05-27 PROCEDURE — 85025 COMPLETE CBC W/AUTO DIFF WBC: CPT

## 2020-05-27 PROCEDURE — 99396 PREV VISIT EST AGE 40-64: CPT | Mod: S$GLB,,, | Performed by: INTERNAL MEDICINE

## 2020-05-27 PROCEDURE — 36415 COLL VENOUS BLD VENIPUNCTURE: CPT

## 2020-05-27 PROCEDURE — 80061 LIPID PANEL: CPT

## 2020-05-27 PROCEDURE — 99396 PR PREVENTIVE VISIT,EST,40-64: ICD-10-PCS | Mod: S$GLB,,, | Performed by: INTERNAL MEDICINE

## 2020-05-27 NOTE — PROGRESS NOTES
Subjective:       Patient ID: Mango Jaramillo Jr. is a 45 y.o. male.    Chief Complaint: Annual Exam    Pt here for annual exam. Counseled on exercise goals.      He was dx with mild covid-19 almost 2 mos ago. He has recovered and has no residual symptoms. Reiterated importance of social distancing, hand washing, masking.     He noticed small nodule on R lateral flank/abd wall last week. Not painful. No erythema. Has not changed in size. No other nodules/masses. No night sweats/fever/wt loss.     Review of Systems   Constitutional: Negative for fatigue, fever and unexpected weight change.   HENT: Negative for congestion, rhinorrhea and sore throat.    Eyes: Negative for visual disturbance.   Respiratory: Negative for cough and shortness of breath.    Cardiovascular: Negative for chest pain, palpitations and leg swelling.   Gastrointestinal: Negative for abdominal pain, blood in stool, constipation and diarrhea.   Genitourinary: Negative for difficulty urinating and dysuria.   Skin: Negative for color change and rash.   Neurological: Negative for dizziness and syncope.   Hematological: Negative for adenopathy.   Psychiatric/Behavioral: Negative for dysphoric mood.       Objective:      Physical Exam   Constitutional: He is oriented to person, place, and time. He appears well-developed and well-nourished.   HENT:   Head: Normocephalic and atraumatic.   Right Ear: External ear normal.   Left Ear: External ear normal.   Mouth/Throat: Oropharynx is clear and moist. No oropharyngeal exudate.   Eyes: Pupils are equal, round, and reactive to light. Conjunctivae and EOM are normal.   Neck: Neck supple. Carotid bruit is not present. No thyromegaly present.   Cardiovascular: Normal rate, regular rhythm, S1 normal, S2 normal, normal heart sounds and intact distal pulses.   Pulmonary/Chest: Effort normal and breath sounds normal.   Abdominal: Soft. Bowel sounds are normal. He exhibits no mass. There is no  hepatosplenomegaly. There is no tenderness.   Musculoskeletal: He exhibits no edema.   Well-defined 2cm globular mass, soft and mobile c/w lipoma   Lymphadenopathy:     He has no cervical adenopathy.   Neurological: He is alert and oriented to person, place, and time. No cranial nerve deficit.   Psychiatric: He has a normal mood and affect. His behavior is normal.       Assessment:       1. Wellness examination        Plan:       1. Appropriate labs  2. Probable lipoma R lateral abd wall; reassured but advised to RTC if any growth, erythema, pain which he understood

## 2021-04-03 ENCOUNTER — PATIENT MESSAGE (OUTPATIENT)
Dept: INTERNAL MEDICINE | Facility: CLINIC | Age: 46
End: 2021-04-03

## 2021-04-16 ENCOUNTER — OFFICE VISIT (OUTPATIENT)
Dept: INTERNAL MEDICINE | Facility: CLINIC | Age: 46
End: 2021-04-16
Payer: COMMERCIAL

## 2021-04-16 ENCOUNTER — LAB VISIT (OUTPATIENT)
Dept: LAB | Facility: OTHER | Age: 46
End: 2021-04-16
Attending: INTERNAL MEDICINE
Payer: COMMERCIAL

## 2021-04-16 VITALS
WEIGHT: 187.81 LBS | HEART RATE: 80 BPM | HEIGHT: 70 IN | DIASTOLIC BLOOD PRESSURE: 66 MMHG | OXYGEN SATURATION: 96 % | SYSTOLIC BLOOD PRESSURE: 100 MMHG | BODY MASS INDEX: 26.89 KG/M2

## 2021-04-16 DIAGNOSIS — Z00.00 WELLNESS EXAMINATION: ICD-10-CM

## 2021-04-16 DIAGNOSIS — Z00.00 WELLNESS EXAMINATION: Primary | ICD-10-CM

## 2021-04-16 LAB
ALBUMIN SERPL BCP-MCNC: 3.9 G/DL (ref 3.5–5.2)
ALP SERPL-CCNC: 102 U/L (ref 55–135)
ALT SERPL W/O P-5'-P-CCNC: 43 U/L (ref 10–44)
ANION GAP SERPL CALC-SCNC: 9 MMOL/L (ref 8–16)
AST SERPL-CCNC: 25 U/L (ref 10–40)
BASOPHILS # BLD AUTO: 0.02 K/UL (ref 0–0.2)
BASOPHILS NFR BLD: 0.6 % (ref 0–1.9)
BILIRUB SERPL-MCNC: 0.6 MG/DL (ref 0.1–1)
BUN SERPL-MCNC: 10 MG/DL (ref 6–20)
CALCIUM SERPL-MCNC: 9.3 MG/DL (ref 8.7–10.5)
CHLORIDE SERPL-SCNC: 106 MMOL/L (ref 95–110)
CHOLEST SERPL-MCNC: 218 MG/DL (ref 120–199)
CHOLEST/HDLC SERPL: 4.3 {RATIO} (ref 2–5)
CO2 SERPL-SCNC: 25 MMOL/L (ref 23–29)
COMPLEXED PSA SERPL-MCNC: 1.4 NG/ML (ref 0–4)
CREAT SERPL-MCNC: 1 MG/DL (ref 0.5–1.4)
DIFFERENTIAL METHOD: ABNORMAL
EOSINOPHIL # BLD AUTO: 0.2 K/UL (ref 0–0.5)
EOSINOPHIL NFR BLD: 6.7 % (ref 0–8)
ERYTHROCYTE [DISTWIDTH] IN BLOOD BY AUTOMATED COUNT: 13.2 % (ref 11.5–14.5)
EST. GFR  (AFRICAN AMERICAN): >60 ML/MIN/1.73 M^2
EST. GFR  (NON AFRICAN AMERICAN): >60 ML/MIN/1.73 M^2
GLUCOSE SERPL-MCNC: 92 MG/DL (ref 70–110)
HCT VFR BLD AUTO: 41.8 % (ref 40–54)
HCV AB SERPL QL IA: NEGATIVE
HDLC SERPL-MCNC: 51 MG/DL (ref 40–75)
HDLC SERPL: 23.4 % (ref 20–50)
HGB BLD-MCNC: 14.1 G/DL (ref 14–18)
IMM GRANULOCYTES # BLD AUTO: 0 K/UL (ref 0–0.04)
IMM GRANULOCYTES NFR BLD AUTO: 0 % (ref 0–0.5)
LDLC SERPL CALC-MCNC: 153.2 MG/DL (ref 63–159)
LYMPHOCYTES # BLD AUTO: 1.7 K/UL (ref 1–4.8)
LYMPHOCYTES NFR BLD: 49.3 % (ref 18–48)
MCH RBC QN AUTO: 29.1 PG (ref 27–31)
MCHC RBC AUTO-ENTMCNC: 33.7 G/DL (ref 32–36)
MCV RBC AUTO: 86 FL (ref 82–98)
MONOCYTES # BLD AUTO: 0.4 K/UL (ref 0.3–1)
MONOCYTES NFR BLD: 11.4 % (ref 4–15)
NEUTROPHILS # BLD AUTO: 1.1 K/UL (ref 1.8–7.7)
NEUTROPHILS NFR BLD: 32 % (ref 38–73)
NONHDLC SERPL-MCNC: 167 MG/DL
NRBC BLD-RTO: 0 /100 WBC
PLATELET # BLD AUTO: 287 K/UL (ref 150–450)
PMV BLD AUTO: 9.8 FL (ref 9.2–12.9)
POTASSIUM SERPL-SCNC: 3.7 MMOL/L (ref 3.5–5.1)
PROT SERPL-MCNC: 7.5 G/DL (ref 6–8.4)
RBC # BLD AUTO: 4.84 M/UL (ref 4.6–6.2)
SODIUM SERPL-SCNC: 140 MMOL/L (ref 136–145)
TRIGL SERPL-MCNC: 69 MG/DL (ref 30–150)
WBC # BLD AUTO: 3.41 K/UL (ref 3.9–12.7)

## 2021-04-16 PROCEDURE — 99396 PREV VISIT EST AGE 40-64: CPT | Mod: S$GLB,,, | Performed by: INTERNAL MEDICINE

## 2021-04-16 PROCEDURE — 99999 PR PBB SHADOW E&M-EST. PATIENT-LVL III: CPT | Mod: PBBFAC,,, | Performed by: INTERNAL MEDICINE

## 2021-04-16 PROCEDURE — 99396 PR PREVENTIVE VISIT,EST,40-64: ICD-10-PCS | Mod: S$GLB,,, | Performed by: INTERNAL MEDICINE

## 2021-04-16 PROCEDURE — 80061 LIPID PANEL: CPT | Performed by: INTERNAL MEDICINE

## 2021-04-16 PROCEDURE — 86803 HEPATITIS C AB TEST: CPT | Performed by: INTERNAL MEDICINE

## 2021-04-16 PROCEDURE — 36415 COLL VENOUS BLD VENIPUNCTURE: CPT | Performed by: INTERNAL MEDICINE

## 2021-04-16 PROCEDURE — 84153 ASSAY OF PSA TOTAL: CPT | Performed by: INTERNAL MEDICINE

## 2021-04-16 PROCEDURE — 80053 COMPREHEN METABOLIC PANEL: CPT | Performed by: INTERNAL MEDICINE

## 2021-04-16 PROCEDURE — 85025 COMPLETE CBC W/AUTO DIFF WBC: CPT | Performed by: INTERNAL MEDICINE

## 2021-04-16 PROCEDURE — 99999 PR PBB SHADOW E&M-EST. PATIENT-LVL III: ICD-10-PCS | Mod: PBBFAC,,, | Performed by: INTERNAL MEDICINE

## 2021-10-27 ENCOUNTER — TELEPHONE (OUTPATIENT)
Dept: INTERNAL MEDICINE | Facility: CLINIC | Age: 46
End: 2021-10-27
Payer: COMMERCIAL

## 2021-10-27 DIAGNOSIS — Z11.3 ROUTINE SCREENING FOR STI (SEXUALLY TRANSMITTED INFECTION): Primary | ICD-10-CM

## 2021-11-01 ENCOUNTER — LAB VISIT (OUTPATIENT)
Dept: LAB | Facility: OTHER | Age: 46
End: 2021-11-01
Attending: INTERNAL MEDICINE
Payer: COMMERCIAL

## 2021-11-01 DIAGNOSIS — Z11.3 ROUTINE SCREENING FOR STI (SEXUALLY TRANSMITTED INFECTION): ICD-10-CM

## 2021-11-01 PROCEDURE — 87491 CHLMYD TRACH DNA AMP PROBE: CPT | Performed by: INTERNAL MEDICINE

## 2021-11-01 PROCEDURE — 87591 N.GONORRHOEAE DNA AMP PROB: CPT | Performed by: INTERNAL MEDICINE

## 2021-11-02 ENCOUNTER — IMMUNIZATION (OUTPATIENT)
Dept: INTERNAL MEDICINE | Facility: CLINIC | Age: 46
End: 2021-11-02
Payer: COMMERCIAL

## 2021-11-02 DIAGNOSIS — Z23 NEED FOR VACCINATION: Primary | ICD-10-CM

## 2021-11-02 PROCEDURE — 91306 COVID-19, MRNA, LNP-S, PF, 100 MCG/0.25 ML DOSE VACCINE (MODERNA BOOSTER): ICD-10-PCS | Mod: S$GLB,,, | Performed by: INTERNAL MEDICINE

## 2021-11-02 PROCEDURE — 91306 COVID-19, MRNA, LNP-S, PF, 100 MCG/0.25 ML DOSE VACCINE (MODERNA BOOSTER): CPT | Mod: S$GLB,,, | Performed by: INTERNAL MEDICINE

## 2021-11-02 PROCEDURE — 0064A COVID-19, MRNA, LNP-S, PF, 100 MCG/0.25 ML DOSE VACCINE (MODERNA BOOSTER): ICD-10-PCS | Mod: S$GLB,,, | Performed by: INTERNAL MEDICINE

## 2021-11-02 PROCEDURE — 0064A COVID-19, MRNA, LNP-S, PF, 100 MCG/0.25 ML DOSE VACCINE (MODERNA BOOSTER): CPT | Mod: S$GLB,,, | Performed by: INTERNAL MEDICINE

## 2021-11-23 LAB
C TRACH DNA SPEC QL NAA+PROBE: NOT DETECTED
N GONORRHOEA DNA SPEC QL NAA+PROBE: NOT DETECTED

## 2022-03-18 ENCOUNTER — PATIENT MESSAGE (OUTPATIENT)
Dept: ADMINISTRATIVE | Facility: HOSPITAL | Age: 47
End: 2022-03-18
Payer: COMMERCIAL

## 2022-04-11 ENCOUNTER — LAB VISIT (OUTPATIENT)
Dept: LAB | Facility: OTHER | Age: 47
End: 2022-04-11
Attending: INTERNAL MEDICINE
Payer: COMMERCIAL

## 2022-04-11 DIAGNOSIS — Z00.00 WELLNESS EXAMINATION: ICD-10-CM

## 2022-04-11 LAB
ALBUMIN SERPL BCP-MCNC: 4.3 G/DL (ref 3.5–5.2)
ALP SERPL-CCNC: 79 U/L (ref 55–135)
ALT SERPL W/O P-5'-P-CCNC: 33 U/L (ref 10–44)
ANION GAP SERPL CALC-SCNC: 10 MMOL/L (ref 8–16)
AST SERPL-CCNC: 19 U/L (ref 10–40)
BASOPHILS # BLD AUTO: 0.03 K/UL (ref 0–0.2)
BASOPHILS NFR BLD: 0.8 % (ref 0–1.9)
BILIRUB SERPL-MCNC: 0.8 MG/DL (ref 0.1–1)
BUN SERPL-MCNC: 10 MG/DL (ref 6–20)
CALCIUM SERPL-MCNC: 9.1 MG/DL (ref 8.7–10.5)
CHLORIDE SERPL-SCNC: 105 MMOL/L (ref 95–110)
CHOLEST SERPL-MCNC: 226 MG/DL (ref 120–199)
CHOLEST/HDLC SERPL: 4.2 {RATIO} (ref 2–5)
CO2 SERPL-SCNC: 24 MMOL/L (ref 23–29)
COMPLEXED PSA SERPL-MCNC: 1.6 NG/ML (ref 0–4)
CREAT SERPL-MCNC: 1 MG/DL (ref 0.5–1.4)
DIFFERENTIAL METHOD: ABNORMAL
EOSINOPHIL # BLD AUTO: 0.2 K/UL (ref 0–0.5)
EOSINOPHIL NFR BLD: 4.5 % (ref 0–8)
ERYTHROCYTE [DISTWIDTH] IN BLOOD BY AUTOMATED COUNT: 12.4 % (ref 11.5–14.5)
EST. GFR  (AFRICAN AMERICAN): >60 ML/MIN/1.73 M^2
EST. GFR  (NON AFRICAN AMERICAN): >60 ML/MIN/1.73 M^2
GLUCOSE SERPL-MCNC: 94 MG/DL (ref 70–110)
HCT VFR BLD AUTO: 44.2 % (ref 40–54)
HDLC SERPL-MCNC: 54 MG/DL (ref 40–75)
HDLC SERPL: 23.9 % (ref 20–50)
HGB BLD-MCNC: 15 G/DL (ref 14–18)
IMM GRANULOCYTES # BLD AUTO: 0 K/UL (ref 0–0.04)
IMM GRANULOCYTES NFR BLD AUTO: 0 % (ref 0–0.5)
LDLC SERPL CALC-MCNC: 152.2 MG/DL (ref 63–159)
LYMPHOCYTES # BLD AUTO: 1.8 K/UL (ref 1–4.8)
LYMPHOCYTES NFR BLD: 50.1 % (ref 18–48)
MCH RBC QN AUTO: 30.4 PG (ref 27–31)
MCHC RBC AUTO-ENTMCNC: 33.9 G/DL (ref 32–36)
MCV RBC AUTO: 90 FL (ref 82–98)
MONOCYTES # BLD AUTO: 0.3 K/UL (ref 0.3–1)
MONOCYTES NFR BLD: 9.1 % (ref 4–15)
NEUTROPHILS # BLD AUTO: 1.3 K/UL (ref 1.8–7.7)
NEUTROPHILS NFR BLD: 35.5 % (ref 38–73)
NONHDLC SERPL-MCNC: 172 MG/DL
NRBC BLD-RTO: 0 /100 WBC
PLATELET # BLD AUTO: 303 K/UL (ref 150–450)
PMV BLD AUTO: 9.4 FL (ref 9.2–12.9)
POTASSIUM SERPL-SCNC: 4.1 MMOL/L (ref 3.5–5.1)
PROT SERPL-MCNC: 7.4 G/DL (ref 6–8.4)
RBC # BLD AUTO: 4.93 M/UL (ref 4.6–6.2)
SODIUM SERPL-SCNC: 139 MMOL/L (ref 136–145)
TRIGL SERPL-MCNC: 99 MG/DL (ref 30–150)
WBC # BLD AUTO: 3.53 K/UL (ref 3.9–12.7)

## 2022-04-11 PROCEDURE — 85025 COMPLETE CBC W/AUTO DIFF WBC: CPT | Performed by: INTERNAL MEDICINE

## 2022-04-11 PROCEDURE — 36415 COLL VENOUS BLD VENIPUNCTURE: CPT | Performed by: INTERNAL MEDICINE

## 2022-04-11 PROCEDURE — 80061 LIPID PANEL: CPT | Performed by: INTERNAL MEDICINE

## 2022-04-11 PROCEDURE — 84153 ASSAY OF PSA TOTAL: CPT | Performed by: INTERNAL MEDICINE

## 2022-04-11 PROCEDURE — 80053 COMPREHEN METABOLIC PANEL: CPT | Performed by: INTERNAL MEDICINE

## 2022-04-13 DIAGNOSIS — Z12.11 COLON CANCER SCREENING: ICD-10-CM

## 2022-05-12 NOTE — PROGRESS NOTES
Subjective:       Patient ID: Mango Jaramillo Jr. is a 47 y.o. male.    Chief Complaint: Annual Exam    Pt here for annual exam. Counseled on exercise goals. R/b of psa d/w pt and he wishes to proceed. R/b of diff screening modalities for colon ca d/w pt and she opts for cologuard.    He has had chronic R shoulder pain for which he saw ortho previously. Was to do injection but he declined at the time. He is interested in PT. Uses NSAIDs occasionally but infrequently.     The 10-year ASCVD risk score (Powderhornmicaela BRADFORD Jr., et al., 2013) is: 3.3%    Values used to calculate the score:      Age: 47 years      Sex: Male      Is Non- : Yes      Diabetic: No      Tobacco smoker: No      Systolic Blood Pressure: 108 mmHg      Is BP treated: No      HDL Cholesterol: 54 mg/dL      Total Cholesterol: 226 mg/dL        Review of Systems   Constitutional: Negative for fatigue, fever and unexpected weight change.   HENT: Negative for congestion, rhinorrhea and sore throat.    Eyes: Negative for visual disturbance.   Respiratory: Negative for cough and shortness of breath.    Cardiovascular: Negative for chest pain, palpitations and leg swelling.   Gastrointestinal: Negative for abdominal pain, blood in stool, constipation and diarrhea.   Genitourinary: Negative for difficulty urinating and dysuria.   Skin: Negative for color change and rash.   Neurological: Negative for dizziness and syncope.   Hematological: Negative for adenopathy.   Psychiatric/Behavioral: Negative for dysphoric mood.       Objective:      Physical Exam  Constitutional:       Appearance: Normal appearance. He is well-developed.   HENT:      Head: Normocephalic and atraumatic.      Right Ear: External ear normal.      Left Ear: External ear normal.      Mouth/Throat:      Pharynx: No oropharyngeal exudate.   Eyes:      Extraocular Movements: Extraocular movements intact.      Conjunctiva/sclera: Conjunctivae normal.      Pupils: Pupils are  equal, round, and reactive to light.   Neck:      Thyroid: No thyromegaly.      Vascular: No carotid bruit.   Cardiovascular:      Rate and Rhythm: Normal rate and regular rhythm.      Heart sounds: Normal heart sounds, S1 normal and S2 normal.   Pulmonary:      Effort: Pulmonary effort is normal.      Breath sounds: Normal breath sounds.   Abdominal:      General: Bowel sounds are normal.      Palpations: Abdomen is soft. There is no mass.      Tenderness: There is no abdominal tenderness.   Musculoskeletal:      Cervical back: Neck supple.      Right lower leg: No edema.      Left lower leg: No edema.   Lymphadenopathy:      Cervical: No cervical adenopathy.   Neurological:      Mental Status: He is alert and oriented to person, place, and time.      Cranial Nerves: No cranial nerve deficit.   Psychiatric:         Mood and Affect: Mood normal.         Behavior: Behavior normal.         Assessment:       1. Wellness examination    2. Chronic right shoulder pain    3. Encounter for colorectal cancer screening        Plan:       1. Recent labs reviewed  2. cologuard  3. PT eval/tx

## 2022-05-13 ENCOUNTER — OFFICE VISIT (OUTPATIENT)
Dept: INTERNAL MEDICINE | Facility: CLINIC | Age: 47
End: 2022-05-13
Payer: COMMERCIAL

## 2022-05-13 VITALS
SYSTOLIC BLOOD PRESSURE: 108 MMHG | OXYGEN SATURATION: 96 % | HEART RATE: 65 BPM | BODY MASS INDEX: 25.99 KG/M2 | WEIGHT: 181.56 LBS | DIASTOLIC BLOOD PRESSURE: 72 MMHG | HEIGHT: 70 IN

## 2022-05-13 DIAGNOSIS — M25.511 CHRONIC RIGHT SHOULDER PAIN: ICD-10-CM

## 2022-05-13 DIAGNOSIS — Z12.12 ENCOUNTER FOR COLORECTAL CANCER SCREENING: ICD-10-CM

## 2022-05-13 DIAGNOSIS — Z12.11 ENCOUNTER FOR COLORECTAL CANCER SCREENING: ICD-10-CM

## 2022-05-13 DIAGNOSIS — Z00.00 WELLNESS EXAMINATION: Primary | ICD-10-CM

## 2022-05-13 DIAGNOSIS — G89.29 CHRONIC RIGHT SHOULDER PAIN: ICD-10-CM

## 2022-05-13 PROCEDURE — 99396 PR PREVENTIVE VISIT,EST,40-64: ICD-10-PCS | Mod: S$GLB,,, | Performed by: INTERNAL MEDICINE

## 2022-05-13 PROCEDURE — 99999 PR PBB SHADOW E&M-EST. PATIENT-LVL III: CPT | Mod: PBBFAC,,, | Performed by: INTERNAL MEDICINE

## 2022-05-13 PROCEDURE — 99999 PR PBB SHADOW E&M-EST. PATIENT-LVL III: ICD-10-PCS | Mod: PBBFAC,,, | Performed by: INTERNAL MEDICINE

## 2022-05-13 PROCEDURE — 99396 PREV VISIT EST AGE 40-64: CPT | Mod: S$GLB,,, | Performed by: INTERNAL MEDICINE

## 2022-05-30 ENCOUNTER — PATIENT MESSAGE (OUTPATIENT)
Dept: ADMINISTRATIVE | Facility: HOSPITAL | Age: 47
End: 2022-05-30
Payer: COMMERCIAL

## 2022-06-01 LAB — NONINV COLON CA DNA+OCC BLD SCRN STL QL: NEGATIVE

## 2022-07-06 ENCOUNTER — TELEPHONE (OUTPATIENT)
Dept: INTERNAL MEDICINE | Facility: CLINIC | Age: 47
End: 2022-07-06
Payer: COMMERCIAL

## 2022-07-06 ENCOUNTER — CLINICAL SUPPORT (OUTPATIENT)
Dept: REHABILITATION | Facility: HOSPITAL | Age: 47
End: 2022-07-06
Payer: COMMERCIAL

## 2022-07-06 DIAGNOSIS — M25.611 DECREASED RIGHT SHOULDER RANGE OF MOTION: ICD-10-CM

## 2022-07-06 DIAGNOSIS — R29.898 DECREASED STRENGTH OF UPPER EXTREMITY: ICD-10-CM

## 2022-07-06 PROCEDURE — 97161 PT EVAL LOW COMPLEX 20 MIN: CPT | Mod: PO

## 2022-07-06 NOTE — TELEPHONE ENCOUNTER
----- Message from Tess Bullard sent at 7/6/2022  7:53 AM CDT -----  Is pt updated with COVID vaccine?    Message    Appointment Request From: Mango Jaramillo Jr.    With Provider: Everette Ramirez MD [[facility]]    Preferred Date Range: 7/8/2022 - 7/11/2022    Preferred Times: Any Time    Reason for visit: 2nd Booster Shot    Comments:

## 2022-07-07 ENCOUNTER — PATIENT OUTREACH (OUTPATIENT)
Dept: ADMINISTRATIVE | Facility: HOSPITAL | Age: 47
End: 2022-07-07
Payer: COMMERCIAL

## 2022-07-07 NOTE — PROGRESS NOTES
Chart Review for overdue colon cancer screening. Mr. Jaramillo has completed a cologuard kit on 5.24.2022

## 2022-07-08 PROBLEM — R29.898 DECREASED STRENGTH OF UPPER EXTREMITY: Status: ACTIVE | Noted: 2022-07-08

## 2022-07-08 PROBLEM — M25.611 DECREASED RIGHT SHOULDER RANGE OF MOTION: Status: ACTIVE | Noted: 2022-07-08

## 2022-07-08 NOTE — PLAN OF CARE
[facility]   Physical Therapy Initial Evaluation     Date: 7/6/2022   Name: Mango Jaramillo Jr.  Clinic Number: [id number]    Therapy Diagnosis:   Encounter Diagnoses   Name Primary?    Decreased right shoulder range of motion     Decreased strength of upper extremity      Physician: Everette Ramirez MD    Physician Orders: PT Eval and Treat   Medical Diagnosis from Referral: M25.511,G89.29 (ICD-10-CM) - Chronic right shoulder pain  Evaluation Date: 7/6/2022  Authorization Period Expiration: 5/13/2022  Plan of Care Expiration: 9/6/2022  Progress Note Due: 8/6/2022  Visit # / Visits authorized: 1/ 1   FOTO: 0/5    Precautions: Standard     Time In: 5:00  Time Out: 6:00  Total Appointment Time (timed & untimed codes): 60 minutes      SUBJECTIVE     Date of onset: a couple of years    History of current condition - Mango reports: its mostly a range issue. Its mainly the R shoulder, but sometimes the L. Reaching behind him is the biggest problem. No problem reaching overhead or carrying things. Routinely has to carry a ladder at work and climb on the ladder or up electrical poles. Is R handed. It was really bad about 2-2.5 years ago. Denies N/T down in his arms, headaches, neck pain, or feelings of coldness in hands/decreased circulation. Has been mindful of not aggravating it lately- and avoiding positions that he knows will cause pain. Can't work above his head for too long because his arm gets tired and a mild amount of pain (2/10). Played baseball in high school- a few outfield positions and 1 infield position. Does not currently participate in any throwing sports whether casually or competitively.     Falls: denies recent falls or stumbles    Imaging, X-Ray: Shoulder: IMPRESSION: Downsloping of the acromion with narrowing of the subacromial space. No acute findings.       Prior Therapy: none  Social History: lives with their family  Occupation:  at [employer]  Prior  Level of Function: independent   Current Level of Function: mod I     Pain:  Current 0/10, worst 5/10, best 0/10   Location: right shoulder    Description: Sharp  Aggravating Factors: reaching behind him  Easing Factors: not getting into that position that causes pain.    Patients goals: improve shoulder mobility     Medical History:   Past Medical History:   Diagnosis Date    Hyperlipidemia     Sciatic nerve pain diagnosed last year       Surgical History:   Mango Jaramillo Jr.  has a past surgical history that includes Hernia repair; Appendectomy; Vasectomy; and Cosmetic surgery.    Medications:   Mango currently has no medications in their medication list.    Allergies:   Review of patient's allergies indicates:  No Known Allergies       OBJECTIVE     Posture: rounded shoulders    Cervical AROM WNL and non-painful    Passive Range of Motion:   Shoulder Right Left   ER at 90 95 95   IR 45 50      Active Range of Motion:   Shoulder Right Left   Flexion WNL WNL   Abduction NWL WNL   functional ER T3 T3   ER at 90 90 deg 90 deg   IR at 90 40 deg 45 deg   Functional IR L3 T3     Upper Extremity Strength   (R) UE (L) UE   Shoulder flexion: 4+/5 pain traps 4+/5   Shoulder Abduction: 4+/5 pain traps 4+/5   Shoulder ER 4+/5 4+/5   Shoulder IR 5/5 5/5   Lower Trap 3-/5 pain 4-/5   Middle Trap 4-/5 4-/5       Special Tests:  AC Joint Left Right   AC Joint Compression Test NT Negative    Empty Can Test NT Negative    Drop Arm test NT  Negative    Subscaputlaris Lift Off Negative  Positive    O'ev's test NT Increased pain with horiz abd but in supraspinatus area   Hawkin's Kenndy NT Negative    Neer's Test NT Negative    Biceps load II NT Negative    Anterior Apprehension test Negative  Negative    Sulcus Sign NT Negative        Joint Mobility: Normal thoracic P/A PIVM. GH joint mobs- equal mobility B in all directions, slight hypermobility.    Palpation: increased tone pec min B. TTP R  subscapularis    Sensation: light touch grossly intact    Flexibility: tight pec minor R as evidenced by: L posterior acromion 2 fingers from table, R posterior acromion 3 fingers from table    Scapular Control/Dyskinesis:    Normal / Subtle / Obvious  Comments    Left  normal N/A    Right  normal N/A         Limitation/Restriction for FOTO Shoulder Survey    Therapist reviewed FOTO scores for Mango Jaramillo Jr. on 7/6/2022.   FOTO documents entered into [employer] - see Media section.    Limitation Score: 26%         TREATMENT     Total Treatment time (time-based codes) separate from Evaluation: 10 minutes      Mango received the treatments listed below:      therapeutic exercises to develop strength and ROM for 10 minutes including: HEP instruction  Date  7/6/2022   VISIT 1/1   FOTO 0/5   POC EXP. DATE 9/6/2022   FACE-TO-FACE 8/6/2022       TABLE:    Snow angels    Sleeper stretch    Shoulder stability planks                        PRONE:    Shoulder flexion    Shoulder scaption    Shoulder abduction    SEATED:    Scapula squeezes            STANDING:    Door way stretch    Theraband  - W's  - T's  - I's  - Rows  - ER  - IR    Overhead press    Ball on wall stability    Body blade    Initials LT         PATIENT EDUCATION AND HOME EXERCISES     Education provided:   - role of PT and goals for therapy  - importance of continuing HEP    Written Home Exercises Provided: yes. Exercises were reviewed and Mango was able to demonstrate them prior to the end of the session.  Mango demonstrated good  understanding of the education provided. See EMR under Patient Instructions for exercises provided during therapy sessions.    ASSESSMENT     Mango is a 47 y.o. male referred to outpatient Physical Therapy with a medical diagnosis of Chronic right shoulder pain. Patient presents to the clinic with a chief complaint of difficulty with IR shoulder mobility and with prolonged overhead work. He was noted to have overall good  mobility in B shoulders aside from R IR, good strength B, no involvement of the cervical spine/neck, and TTP in the subscapularis muscle. These impairments are impacting his ability to perform daily activities like washing his back in the shower or performing some overhead job responsibilities efficiently. He would benefit from skilled physical therapy to address these impairments to return him to to his prior level of function.    Patient prognosis is Excellent.   Patient will benefit from skilled outpatient Physical Therapy to address the deficits stated above and in the chart below, provide patient /family education, and to maximize patientt's level of independence.     Plan of care discussed with patient: Yes  Patient's spiritual, cultural and educational needs considered and patient is agreeable to the plan of care and goals as stated below:     Anticipated Barriers for therapy: none    Medical Necessity is demonstrated by the following  History  Co-morbidities and personal factors that may impact the plan of care Co-morbidities:   none    Personal Factors:   no deficits     low   Examination  Body Structures and Functions, activity limitations and participation restrictions that may impact the plan of care Body Regions:   upper extremities    Body Systems:    ROM    Participation Restrictions:   Unable to perform overhead job responsibilities efficiently     Activity limitations:   Learning and applying knowledge  no deficits    General Tasks and Commands  no deficits    Communication  no deficits    Mobility  no deficits    Self care  washing oneself (bathing, drying, washing hands)    Domestic Life  no deficits    Interactions/Relationships  no deficits    Life Areas  no deficits    Community and Social Life  no deficits         low   Clinical Presentation stable and uncomplicated low   Decision Making/ Complexity Score: low     Goals:  Short Term Goals: 4 weeks   1. Pt will be independent with HEP to  promote independent management of current diagnosis.  2. Pt will have improved R shoulder strength to 5/5 to improve ability to perform job responsibilities.  3. Pt will report subjective pain ratings of 0/10 with ADLs.    Long Term Goals: 8 weeks   1. Pt will have improved R shoulder functional IR to equal the L.  2. Pt will be able to perform overhead activities for at least 5 minutes at work with no pain.  3. Pt will have improved scapular retraction strength by at least 1 grade to improve mechanics with overhead activities.    PLAN   Plan of care Certification: 7/6/2022 to 9/6/2022.    Outpatient Physical Therapy 1-2 times weekly for 8 weeks to include the following interventions: Manual Therapy, Moist Heat/ Ice, Neuromuscular Re-ed, Patient Education, Therapeutic Activities, Therapeutic Exercise and kinesiotaping and dry needling.     Mitra Aguirre, PT      I CERTIFY THE NEED FOR THESE SERVICES FURNISHED UNDER THIS PLAN OF TREATMENT AND WHILE UNDER MY CARE   Physician's comments:     Physician's Signature: ___________________________________________________

## 2022-07-11 ENCOUNTER — CLINICAL SUPPORT (OUTPATIENT)
Dept: REHABILITATION | Facility: HOSPITAL | Age: 47
End: 2022-07-11
Payer: COMMERCIAL

## 2022-07-11 DIAGNOSIS — M25.611 DECREASED RIGHT SHOULDER RANGE OF MOTION: Primary | ICD-10-CM

## 2022-07-11 DIAGNOSIS — R29.898 DECREASED STRENGTH OF UPPER EXTREMITY: ICD-10-CM

## 2022-07-11 PROCEDURE — 97110 THERAPEUTIC EXERCISES: CPT | Mod: PO

## 2022-07-11 NOTE — PROGRESS NOTES
"[facility]   Physical Therapy Treatment Note     Name: Mango Jaramillo Jr.  Clinic Number: [id number]    Therapy Diagnosis:   Encounter Diagnoses   Name Primary?    Decreased right shoulder range of motion Yes    Decreased strength of upper extremity      Physician: Everette Ramirez MD    Visit Date: 7/11/2022      Physician Orders: PT Eval and Treat   Medical Diagnosis from Referral: M25.511,G89.29 (ICD-10-CM) - Chronic right shoulder pain  Evaluation Date: 7/6/2022  Authorization Period Expiration: 12/31/2022  Plan of Care Expiration: 9/6/2022  Progress Note Due: 8/6/2022  Visit # / Visits authorized: 1/ 20  FOTO: 1/5     Precautions: Standard     PTA Visit #: 0/5     Time In: 5:04  Time Out: 5:55  Total Billable Time: 51 minutes    SUBJECTIVE     Pt reports: his shoulder feels alright.  He was compliant with home exercise program.  Response to previous treatment: favorable- felt ok  Functional change: none at the moment    Pain: 2/10  Location: right shoulder      OBJECTIVE     Objective Measures updated at progress report unless specified.     Treatment     Mango received the treatments listed below:      therapeutic exercises to develop strength and posture for 46 minutes including:  Date  7/11/2022 7/6/2022   VISIT 1/20 1/1   FOTO 1/5 0/5   POC EXP. DATE 9/6/2022 9/6/2022   FACE-TO-FACE 8/6/2022 8/6/2022          TABLE:      Snow angels      Sleeper stretch 3 x 30" R     Shoulder stability planks                                         PRONE:      Shoulder flexion      Shoulder scaption (Ys) 2 x 10     Shoulder abduction (Ts) 2 x 10     SEATED:      Scapula squeezes                    STANDING:      Door way stretch 3 x 30"     Theraband  - W's  - T's  - I's  - Rows  - ER  - IR   2 x 10 BTB        2 x 10 GTB B  2 x 10 GTB B     Overhead press      Ball on wall stability      Body blade 2x 30" R     Initials LT LT          manual therapy techniques: Soft tissue " Mobilization were applied to the: R shoulder for 5 minutes, including:  -STM to subscapularis     Patient Education and Home Exercises     Home Exercises Provided and Patient Education Provided     Education provided:   - importance of continuing HEP    Written Home Exercises Provided: Patient instructed to cont prior HEP. Exercises were reviewed and Mango was able to demonstrate them prior to the end of the session.  Mango demonstrated good  understanding of the education provided. See EMR under Patient Instructions for exercises provided during therapy sessions    ASSESSMENT     Mango arrived to therapy continuing to report low levels of pain in his shoulder. He was able to begin making progress toward his goals by initiating an exercise program. He tolerated all exercises well with no reports of increased symptoms at the end of today's session. Focused on shoulder stability and rotator cuff strengthening.    Mango Is progressing well towards his goals.   Pt prognosis is Excellent.     Pt will continue to benefit from skilled outpatient physical therapy to address the deficits listed in the problem list box on initial evaluation, provide pt/family education and to maximize pt's level of independence in the home and community environment.     Pt's spiritual, cultural and educational needs considered and pt agreeable to plan of care and goals.     Anticipated barriers to physical therapy: none    Goals: Short Term Goals: 4 weeks   1. Pt will be independent with HEP to promote independent management of current diagnosis.  2. Pt will have improved R shoulder strength to 5/5 to improve ability to perform job responsibilities.  3. Pt will report subjective pain ratings of 0/10 with ADLs.     Long Term Goals: 8 weeks   1. Pt will have improved R shoulder functional IR to equal the L.  2. Pt will be able to perform overhead activities for at least 5 minutes at work with no pain.  3. Pt will have improved scapular  retraction strength by at least 1 grade to improve mechanics with overhead activities.    PLAN     Continue with PT POC, progressing as tolerated.    Mitra Aguirre, PT

## 2022-07-20 ENCOUNTER — CLINICAL SUPPORT (OUTPATIENT)
Dept: REHABILITATION | Facility: HOSPITAL | Age: 47
End: 2022-07-20
Payer: COMMERCIAL

## 2022-07-20 DIAGNOSIS — R29.898 DECREASED STRENGTH OF UPPER EXTREMITY: ICD-10-CM

## 2022-07-20 DIAGNOSIS — M25.611 DECREASED RIGHT SHOULDER RANGE OF MOTION: Primary | ICD-10-CM

## 2022-07-20 PROCEDURE — 97110 THERAPEUTIC EXERCISES: CPT | Mod: PO

## 2022-07-20 NOTE — PROGRESS NOTES
"[facility]   Physical Therapy Treatment Note     Name: Mango Jaramillo Jr.  Clinic Number: [id number]    Therapy Diagnosis:   Encounter Diagnoses   Name Primary?    Decreased right shoulder range of motion Yes    Decreased strength of upper extremity      Physician: Everette Ramirez MD    Visit Date: 7/20/2022      Physician Orders: PT Eval and Treat   Medical Diagnosis from Referral: M25.511,G89.29 (ICD-10-CM) - Chronic right shoulder pain  Evaluation Date: 7/6/2022  Authorization Period Expiration: 12/31/2022  Plan of Care Expiration: 9/6/2022  Progress Note Due: 8/6/2022  Visit # / Visits authorized: 2/ 20  FOTO: 2/5     Precautions: Standard     PTA Visit #: 0/5     Time In: 5:00  Time Out: 5:50  Total Billable Time: 50 minutes    SUBJECTIVE     Pt reports: he did a lot of overhead work today; his shoulder doesn't hurt but it is annoying  He was compliant with home exercise program.  Response to previous treatment: favorable- felt ok  Functional change: none at the moment    Pain: 2/10  Location: right shoulder      OBJECTIVE     Objective Measures updated at progress report unless specified.     Treatment     Mango received the treatments listed below:      therapeutic exercises to develop strength and posture for 50 minutes including:  Date  7/20/2022 7/11/2022 7/6/2022   VISIT 2/20 1/20 1/1   FOTO 2/5 1/5 0/5   POC EXP. DATE 9/6/2022 9/6/2022 9/6/2022   FACE-TO-FACE 8/6/2022 8/6/2022 8/6/2022    UBE L3 3' fwd/bwd      TABLE:       Snow angels       Sleeper stretch 3 x 30" R 3 x 30" R     Shoulder stability planks                                               PRONE:       Shoulder flexion       Shoulder scaption (Ys) 2 x 10 2 x 10     Shoulder abduction (Ts) 2 x 10 2 x 10     SEATED:       Scapula squeezes                       STANDING:       Door way stretch 3 x 30"  3 x 30"     Theraband  -Ys  - W's  - T's  - I's  - Rows  - ER  - IR  -pulldowns from all the way " "overhead down   2 x 10 RTB  2 x 10 BTB        2 x 10 GTB B  2 x 10 GTB B  2 x 20 GTB      2 x 10 BTB        2 x 10 GTB B  2 x 10 GTB B     Overhead press       Ball on wall stability       Body blade  2x 30" R     Overhead dribbling 2 x 45" B     IR stretch  3 x 30" R     Initials LT LT LT          manual therapy techniques: Soft tissue Mobilization were applied to the: R shoulder for 0 minutes, including:  -STM to subscapularis     Patient Education and Home Exercises     Home Exercises Provided and Patient Education Provided     Education provided:   - importance of continuing HEP    Written Home Exercises Provided: Patient instructed to cont prior HEP. Exercises were reviewed and Mango was able to demonstrate them prior to the end of the session.  Mango demonstrated good  understanding of the education provided. See EMR under Patient Instructions for exercises provided during therapy sessions    ASSESSMENT     Mango arrived to therapy continuing to report low levels of pain in his shoulder. He tolerated all exercises well with no reports of increased symptoms at the end of today's session. Continued to work on general shoulder stability and strength, as well as overhead activities.    Mango Is progressing well towards his goals.   Pt prognosis is Excellent.     Pt will continue to benefit from skilled outpatient physical therapy to address the deficits listed in the problem list box on initial evaluation, provide pt/family education and to maximize pt's level of independence in the home and community environment.     Pt's spiritual, cultural and educational needs considered and pt agreeable to plan of care and goals.     Anticipated barriers to physical therapy: none    Goals: Short Term Goals: 4 weeks   1. Pt will be independent with HEP to promote independent management of current diagnosis.  2. Pt will have improved R shoulder strength to 5/5 to improve ability to perform job responsibilities.  3. Pt will " report subjective pain ratings of 0/10 with ADLs.     Long Term Goals: 8 weeks   1. Pt will have improved R shoulder functional IR to equal the L.  2. Pt will be able to perform overhead activities for at least 5 minutes at work with no pain.  3. Pt will have improved scapular retraction strength by at least 1 grade to improve mechanics with overhead activities.    PLAN     Continue with PT POC, progressing as tolerated.    Mitra Aguirre, PT

## 2022-08-03 ENCOUNTER — CLINICAL SUPPORT (OUTPATIENT)
Dept: REHABILITATION | Facility: HOSPITAL | Age: 47
End: 2022-08-03
Payer: COMMERCIAL

## 2022-08-03 DIAGNOSIS — R29.898 DECREASED STRENGTH OF UPPER EXTREMITY: ICD-10-CM

## 2022-08-03 DIAGNOSIS — M25.611 DECREASED RIGHT SHOULDER RANGE OF MOTION: Primary | ICD-10-CM

## 2022-08-03 PROCEDURE — 97110 THERAPEUTIC EXERCISES: CPT | Mod: PO,CQ

## 2022-08-03 NOTE — PROGRESS NOTES
"[facility]   Physical Therapy Treatment Note     Name: Mango Jaramillo Jr.  Clinic Number: [id number]    Therapy Diagnosis:   Encounter Diagnoses   Name Primary?    Decreased right shoulder range of motion Yes    Decreased strength of upper extremity      Physician: Everette Ramirez MD    Visit Date: 8/3/2022      Physician Orders: PT Eval and Treat   Medical Diagnosis from Referral: M25.511,G89.29 (ICD-10-CM) - Chronic right shoulder pain  Evaluation Date: 7/6/2022  Authorization Period Expiration: 12/31/2022  Plan of Care Expiration: 9/6/2022  Progress Note Due: 8/6/2022  Visit # / Visits authorized: 3/ 20  FOTO: 3/5     Precautions: Standard     PTA Visit #: 1/5     Time In: 5:00  Time Out: 6:00  Total Billable Time: 50 minutes    SUBJECTIVE     Pt reports: its alright, still have trouble reaching behind me   He was compliant with home exercise program.  Response to previous treatment: favorable- felt ok  Functional change: none at the moment    Pain: 2/10  Location: right shoulder      OBJECTIVE     Objective Measures updated at progress report unless specified.     Treatment     Mango received the treatments listed below:      therapeutic exercises to develop strength and posture for 55 minutes including:  Date  8/3/2022 7/20/2022 7/11/2022 7/6/2022   VISIT 3/20 2/20 1/20 1/1   FOTO 3/5 2/5 1/5 0/5   POC EXP. DATE  9/6/2022 9/6/2022 9/6/2022   FACE-TO-FACE  8/6/2022 8/6/2022 8/6/2022    UBE  L3 3' fwd/bwd      TABLE:        Snow angels        Sleeper stretch  3 x 30" R 3 x 30" R     Shoulder stability planks                                                     PRONE:        Shoulder flexion        Shoulder scaption (Ys) 3 x 10  2 x 10 2 x 10     Shoulder abduction (Ts) 3 x 10  2 x 10 2 x 10     SEATED:        Scapula squeezes                          STANDING:        Door way stretch 3 x 30"  3 x 30"  3 x 30"     Theraband  -Ys  - W's  - T's  - I's  - Rows  - ER  - " "IR  -pulldowns from all the way overhead down    2 x 10 RTB  2 x 10 BTB        2 x 10 GTB B  2 x 10 GTB B  2 x 20 GTB      2 x 10 BTB        2 x 10 GTB B  2 x 10 GTB B     Overhead press        Ball on wall stability        W to a press   2 x 10  RTB       Body blade 2 x 30" R   nuetral/pronated ea   2x 30" R     Overhead dribbling 2 x 45" B  2 x 45" B     Scaption  3 x 10 1#       Serratus punches   2 x 10  Ballet bar       Wall clocks   5 rounds RTB       IR stretch  3 x 30" R  3 x 30" R     Initials EM LT LT LT          manual therapy techniques: Soft tissue Mobilization were applied to the: R shoulder for 5 minutes, including:  -STM to subscapularis   -inferior/posterior shoulder glides     Patient Education and Home Exercises     Home Exercises Provided and Patient Education Provided     Education provided:   - importance of continuing HEP    Written Home Exercises Provided: Patient instructed to cont prior HEP. Exercises were reviewed and Mango was able to demonstrate them prior to the end of the session.  Mango demonstrated good  understanding of the education provided. See EMR under Patient Instructions for exercises provided during therapy sessions    ASSESSMENT     Patient arrived reporting no increases in pain but does note he is still limited with IR and fatigues quickly with overhead activities.  Patient progressed through exercises with no discomforts just muscle fatigue. Patient ended session stating he felt fine.     Mango Is progressing well towards his goals.   Pt prognosis is Excellent.     Pt will continue to benefit from skilled outpatient physical therapy to address the deficits listed in the problem list box on initial evaluation, provide pt/family education and to maximize pt's level of independence in the home and community environment.     Pt's spiritual, cultural and educational needs considered and pt agreeable to plan of care and goals.     Anticipated barriers to physical therapy: " none    Goals: Short Term Goals: 4 weeks   1. Pt will be independent with HEP to promote independent management of current diagnosis.  2. Pt will have improved R shoulder strength to 5/5 to improve ability to perform job responsibilities.  3. Pt will report subjective pain ratings of 0/10 with ADLs.     Long Term Goals: 8 weeks   1. Pt will have improved R shoulder functional IR to equal the L.  2. Pt will be able to perform overhead activities for at least 5 minutes at work with no pain.  3. Pt will have improved scapular retraction strength by at least 1 grade to improve mechanics with overhead activities.    PLAN     Continue with PT POC, progressing as tolerated.    Hany Qureshi, PTA

## 2022-08-09 ENCOUNTER — DOCUMENTATION ONLY (OUTPATIENT)
Dept: REHABILITATION | Facility: HOSPITAL | Age: 47
End: 2022-08-09
Payer: COMMERCIAL

## 2022-08-09 ENCOUNTER — CLINICAL SUPPORT (OUTPATIENT)
Dept: REHABILITATION | Facility: HOSPITAL | Age: 47
End: 2022-08-09
Payer: COMMERCIAL

## 2022-08-09 DIAGNOSIS — R29.898 DECREASED STRENGTH OF UPPER EXTREMITY: ICD-10-CM

## 2022-08-09 DIAGNOSIS — M25.611 DECREASED RIGHT SHOULDER RANGE OF MOTION: Primary | ICD-10-CM

## 2022-08-09 PROCEDURE — 97110 THERAPEUTIC EXERCISES: CPT | Mod: PO

## 2022-08-09 NOTE — PROGRESS NOTES
"[facility]   Physical Therapy Treatment Note     Name: Mango Jaramillo Jr.  Clinic Number: [id number]    Therapy Diagnosis:   Encounter Diagnoses   Name Primary?    Decreased right shoulder range of motion Yes    Decreased strength of upper extremity      Physician: Everette Ramirez MD    Visit Date: 8/9/2022      Physician Orders: PT Eval and Treat   Medical Diagnosis from Referral: M25.511,G89.29 (ICD-10-CM) - Chronic right shoulder pain  Evaluation Date: 7/6/2022  Authorization Period Expiration: 12/31/2022  Plan of Care Expiration: 9/6/2022  Progress Note Due: 8/6/2022  Visit # / Visits authorized: 4/ 20  FOTO: 4/5     Precautions: Standard     PTA Visit #: 0/5     Time In: 5:00  Time Out: 5:50  Total Billable Time: 50 minutes    SUBJECTIVE     Pt reports: his shoulder feels good today, no pain at work   He was compliant with home exercise program.  Response to previous treatment: favorable- felt ok  Functional change: none at the moment    Pain: 2/10  Location: right shoulder      OBJECTIVE     Objective Measures updated at progress report unless specified.     Treatment     Mango received the treatments listed below:      therapeutic exercises to develop strength and posture for 50 minutes including:  Date  8/9/2022 8/3/2022 7/20/2022 7/11/2022 7/6/2022   VISIT 4/20 3/20 2/20 1/20 1/1   FOTO 4/5 3/5 2/5 1/5 0/5   POC EXP. DATE 9/6/2022 9/6/2022 9/6/2022 9/6/2022   FACE-TO-FACE 9/9/2022 8/6/2022 8/6/2022 8/6/2022    UBE L3 3' fwd/bwd  L3 3' fwd/bwd      TABLE:         Snow angels         Sleeper stretch   3 x 30" R 3 x 30" R     Shoulder stability planks                                                           PRONE:         Shoulder flexion         Shoulder scaption (Ys) 3 x 10 3 x 10  2 x 10 2 x 10     Shoulder abduction (Ts) 3 x 10 3 x 10  2 x 10 2 x 10     SEATED:         Scapula squeezes                             STANDING:         Door way stretch  3 x 30"  3 x " "30"  3 x 30"     Theraband  -Ys  - W's  - T's  - I's  - Rows  - ER  - IR  -pulldowns from all the way overhead down   2 x 10 YTB  2 x 10 BTB        2 x 10 GTB B  2 x 10 GTB B          2 x 10 RTB  2 x 10 BTB        2 x 10 GTB B  2 x 10 GTB B  2 x 20 GTB      2 x 10 BTB        2 x 10 GTB B  2 x 10 GTB B     Clipping clothespins   2'     Overhead press         Ball on wall stability         W to a press    2 x 10  RTB       Body blade  2 x 30" R   nuetral/pronated ea   2x 30" R     Overhead dribbling 2 x 45" B 2 x 45" B  2 x 45" B     Scaption 2 x 10 x 3#  3 x 10 1#       Serratus punches  25x ballet bar  2 x 10  Ballet bar       Wall clocks  4 rounds RTB  5 rounds RTB       IR stretch  3 x 30" R 3 x 30" R  3 x 30" R             Initials LT EM LT LT LT          manual therapy techniques: Soft tissue Mobilization were applied to the: R shoulder for 0 minutes, including:  -STM to subscapularis   -inferior/posterior shoulder glides     Patient Education and Home Exercises     Home Exercises Provided and Patient Education Provided     Education provided:   - importance of continuing HEP    Written Home Exercises Provided: Patient instructed to cont prior HEP. Exercises were reviewed and Mango was able to demonstrate them prior to the end of the session.  Mango demonstrated good  understanding of the education provided. See EMR under Patient Instructions for exercises provided during therapy sessions    ASSESSMENT     Mango arrived reporting his usual low levels of pain in his shoulder. Continued to work on overhead strength and endurance. Was noted to have improved functional IR today (L2). Tolerated all exercises well with no reports of increased symptoms at the end of today's session.    Mango Is progressing well towards his goals.   Pt prognosis is Excellent.     Pt will continue to benefit from skilled outpatient physical therapy to address the deficits listed in the problem list box on initial evaluation, provide " pt/family education and to maximize pt's level of independence in the home and community environment.     Pt's spiritual, cultural and educational needs considered and pt agreeable to plan of care and goals.     Anticipated barriers to physical therapy: none    Goals: Short Term Goals: 4 weeks   1. Pt will be independent with HEP to promote independent management of current diagnosis.  2. Pt will have improved R shoulder strength to 5/5 to improve ability to perform job responsibilities.  3. Pt will report subjective pain ratings of 0/10 with ADLs.     Long Term Goals: 8 weeks   1. Pt will have improved R shoulder functional IR to equal the L.  2. Pt will be able to perform overhead activities for at least 5 minutes at work with no pain.  3. Pt will have improved scapular retraction strength by at least 1 grade to improve mechanics with overhead activities.    PLAN     Continue with PT POC, progressing as tolerated.    Mitra Aguirre, PT

## 2022-08-09 NOTE — PROGRESS NOTES
PT/PTA met face to face to discuss pt's treatment plan and progress towards established goals. Pt will be seen by a physical therapist minimally every 6th visit or every 30 days.    Mitra Aguirre, PT  8/9/2022

## 2023-04-04 ENCOUNTER — OFFICE VISIT (OUTPATIENT)
Dept: PODIATRY | Facility: CLINIC | Age: 48
End: 2023-04-04
Payer: COMMERCIAL

## 2023-04-04 VITALS
HEIGHT: 70 IN | SYSTOLIC BLOOD PRESSURE: 126 MMHG | HEART RATE: 62 BPM | BODY MASS INDEX: 28.21 KG/M2 | WEIGHT: 197.06 LBS | DIASTOLIC BLOOD PRESSURE: 77 MMHG

## 2023-04-04 DIAGNOSIS — M72.2 PLANTAR FASCIITIS: Primary | ICD-10-CM

## 2023-04-04 PROCEDURE — 99203 PR OFFICE/OUTPT VISIT, NEW, LEVL III, 30-44 MIN: ICD-10-PCS | Mod: S$GLB,,, | Performed by: PODIATRIST

## 2023-04-04 PROCEDURE — 99999 PR PBB SHADOW E&M-EST. PATIENT-LVL III: CPT | Mod: PBBFAC,,, | Performed by: PODIATRIST

## 2023-04-04 PROCEDURE — 99999 PR PBB SHADOW E&M-EST. PATIENT-LVL III: ICD-10-PCS | Mod: PBBFAC,,, | Performed by: PODIATRIST

## 2023-04-04 PROCEDURE — 99203 OFFICE O/P NEW LOW 30 MIN: CPT | Mod: S$GLB,,, | Performed by: PODIATRIST

## 2023-04-04 RX ORDER — METHYLPREDNISOLONE 4 MG/1
TABLET ORAL
Qty: 1 EACH | Refills: 0 | Status: SHIPPED | OUTPATIENT
Start: 2023-04-04 | End: 2023-04-25

## 2023-04-04 NOTE — PROGRESS NOTES
Subjective:      Patient ID: Mango Jaramillo Jr. is a 47 y.o. male.    Chief Complaint: Heel Pain (Left foot)    Mango is a 47 y.o. male who presents to the clinic complaining of heel pain in the left foot, especially with the first step in the morning. The pain is described as Aching and Throbbing. The onset of the pain was gradual and has worsened over the past several weeks. Mango rates the pain as 6/10. He denies a history of trauma. Prior treatments include none.    Review of Systems   Constitutional: Negative for chills, fever and malaise/fatigue.   HENT:  Negative for hearing loss.    Cardiovascular:  Negative for claudication.   Respiratory:  Negative for shortness of breath.    Skin:  Negative for flushing and rash.   Musculoskeletal:  Negative for joint pain and myalgias.   Neurological:  Negative for loss of balance, numbness, paresthesias and sensory change.   Psychiatric/Behavioral:  Negative for altered mental status.          Objective:      Physical Exam  Vitals reviewed.   Cardiovascular:      Pulses:           Dorsalis pedis pulses are 2+ on the right side and 2+ on the left side.        Posterior tibial pulses are 2+ on the right side and 2+ on the left side.      Comments: No edema noted b/L  Musculoskeletal:         General: Tenderness present.      Comments:        Feet:      Right foot:      Protective Sensation: 5 sites tested.  5 sites sensed.      Left foot:      Protective Sensation: 5 sites tested.  5 sites sensed.   Skin:     General: Skin is warm.      Capillary Refill: Capillary refill takes 2 to 3 seconds.      Comments: Normal skin tugor noted.   No open lesion noted b/L  Skin temp is warm to warm from proximal to distal b/L.  Webspaces clean, dry, and intact     Neurological:      Mental Status: He is alert.      Comments: Gross sensation intact b/L       Non reducible equinus noted to left lower extremity  Pain upon palpation to the medial heel, left            Assessment:        Encounter Diagnosis   Name Primary?    Plantar fasciitis - Left Foot Yes         Plan:       Mango was seen today for heel pain.    Diagnoses and all orders for this visit:    Plantar fasciitis - Left Foot      I counseled the patient on his conditions, their implications and medical management.        .Etiologies of plantar fasciitis discussed with the pt. Pt advised to stretch the achilles tendon complex daily and especially before increased activity and sports. Pt advised on RICE and OTC NSAIDs for residual pain. Pt advised to purchase gel heel cups to be worn in shoes.    Patient instructed on adequate icing techniques. Patient should ice the affected area at least once per day x 10 minutes for 10 days . I advised the  patient that extra icing would also be beneficial to ensure adequate anti inflammatory effect     Rx medrol dose pack  Call or return to clinic prn if these symptoms worsen or fail to improve as anticipated.

## 2023-05-01 ENCOUNTER — PATIENT MESSAGE (OUTPATIENT)
Dept: INTERNAL MEDICINE | Facility: CLINIC | Age: 48
End: 2023-05-01
Payer: COMMERCIAL

## 2023-05-09 NOTE — PROGRESS NOTES
Subjective:       Patient ID: Mango Jaramillo Jr. is a 48 y.o. male.    Chief Complaint: Annual Exam    Pt here for annual exam. Counseled on exercise goals. R/b of psa d/w pt and he wishes to proceed.     He has had chronic R shoulder pain for which he saw ortho previously. Was to do injection but he declined at the time. He did PT which helped some. Uses NSAIDs occasionally but infrequently.     The 10-year ASCVD risk score (Juan Miguel LEWIS, et al., 2019) is: 3.5%    Values used to calculate the score:      Age: 48 years      Sex: Male      Is Non- : Yes      Diabetic: No      Tobacco smoker: No      Systolic Blood Pressure: 110 mmHg      Is BP treated: No      HDL Cholesterol: 54 mg/dL      Total Cholesterol: 226 mg/dL          Review of Systems   Constitutional:  Negative for fatigue, fever and unexpected weight change.   HENT:  Negative for congestion, rhinorrhea and sore throat.    Eyes:  Negative for visual disturbance.   Respiratory:  Negative for cough and shortness of breath.    Cardiovascular:  Negative for chest pain, palpitations and leg swelling.   Gastrointestinal:  Negative for abdominal pain, blood in stool, constipation and diarrhea.   Genitourinary:  Negative for difficulty urinating and dysuria.   Skin:  Negative for color change and rash.   Neurological:  Negative for dizziness and syncope.   Hematological:  Negative for adenopathy.   Psychiatric/Behavioral:  Negative for dysphoric mood.      Objective:      Physical Exam  Constitutional:       Appearance: Normal appearance. He is well-developed.   HENT:      Head: Normocephalic and atraumatic.      Right Ear: External ear normal.      Left Ear: External ear normal.      Mouth/Throat:      Pharynx: No oropharyngeal exudate.   Eyes:      Extraocular Movements: Extraocular movements intact.      Conjunctiva/sclera: Conjunctivae normal.      Pupils: Pupils are equal, round, and reactive to light.   Neck:      Thyroid: No  thyromegaly.      Vascular: No carotid bruit.   Cardiovascular:      Rate and Rhythm: Normal rate and regular rhythm.      Heart sounds: Normal heart sounds, S1 normal and S2 normal.   Pulmonary:      Effort: Pulmonary effort is normal.      Breath sounds: Normal breath sounds.   Abdominal:      General: Bowel sounds are normal.      Palpations: Abdomen is soft. There is no mass.      Tenderness: There is no abdominal tenderness.   Musculoskeletal:      Cervical back: Neck supple.      Right lower leg: No edema.      Left lower leg: No edema.   Lymphadenopathy:      Cervical: No cervical adenopathy.   Neurological:      Mental Status: He is alert and oriented to person, place, and time.      Cranial Nerves: No cranial nerve deficit.   Psychiatric:         Mood and Affect: Mood normal.         Behavior: Behavior normal.       Assessment:       1. Wellness examination    2. Mixed hyperlipidemia        Plan:       1. Appropriate labs

## 2023-05-12 ENCOUNTER — OFFICE VISIT (OUTPATIENT)
Dept: INTERNAL MEDICINE | Facility: CLINIC | Age: 48
End: 2023-05-12
Payer: COMMERCIAL

## 2023-05-12 ENCOUNTER — LAB VISIT (OUTPATIENT)
Dept: LAB | Facility: OTHER | Age: 48
End: 2023-05-12
Attending: INTERNAL MEDICINE
Payer: COMMERCIAL

## 2023-05-12 VITALS
HEIGHT: 70 IN | DIASTOLIC BLOOD PRESSURE: 76 MMHG | BODY MASS INDEX: 28.15 KG/M2 | HEART RATE: 70 BPM | WEIGHT: 196.63 LBS | OXYGEN SATURATION: 97 % | SYSTOLIC BLOOD PRESSURE: 110 MMHG

## 2023-05-12 DIAGNOSIS — Z00.00 WELLNESS EXAMINATION: Primary | ICD-10-CM

## 2023-05-12 DIAGNOSIS — E78.2 MIXED HYPERLIPIDEMIA: Chronic | ICD-10-CM

## 2023-05-12 DIAGNOSIS — Z00.00 WELLNESS EXAMINATION: ICD-10-CM

## 2023-05-12 LAB
ALBUMIN SERPL BCP-MCNC: 4 G/DL (ref 3.5–5.2)
ALP SERPL-CCNC: 87 U/L (ref 55–135)
ALT SERPL W/O P-5'-P-CCNC: 22 U/L (ref 10–44)
ANION GAP SERPL CALC-SCNC: 9 MMOL/L (ref 8–16)
AST SERPL-CCNC: 18 U/L (ref 10–40)
BASOPHILS # BLD AUTO: 0.02 K/UL (ref 0–0.2)
BASOPHILS NFR BLD: 0.5 % (ref 0–1.9)
BILIRUB SERPL-MCNC: 0.5 MG/DL (ref 0.1–1)
BUN SERPL-MCNC: 12 MG/DL (ref 6–20)
CALCIUM SERPL-MCNC: 8.8 MG/DL (ref 8.7–10.5)
CHLORIDE SERPL-SCNC: 107 MMOL/L (ref 95–110)
CHOLEST SERPL-MCNC: 226 MG/DL (ref 120–199)
CHOLEST/HDLC SERPL: 4.7 {RATIO} (ref 2–5)
CO2 SERPL-SCNC: 23 MMOL/L (ref 23–29)
COMPLEXED PSA SERPL-MCNC: 1.7 NG/ML (ref 0–4)
CREAT SERPL-MCNC: 1.1 MG/DL (ref 0.5–1.4)
DIFFERENTIAL METHOD: ABNORMAL
EOSINOPHIL # BLD AUTO: 0.1 K/UL (ref 0–0.5)
EOSINOPHIL NFR BLD: 3.7 % (ref 0–8)
ERYTHROCYTE [DISTWIDTH] IN BLOOD BY AUTOMATED COUNT: 12.6 % (ref 11.5–14.5)
EST. GFR  (NO RACE VARIABLE): >60 ML/MIN/1.73 M^2
ESTIMATED AVG GLUCOSE: 120 MG/DL (ref 68–131)
GLUCOSE SERPL-MCNC: 92 MG/DL (ref 70–110)
HBA1C MFR BLD: 5.8 % (ref 4–5.6)
HCT VFR BLD AUTO: 42.4 % (ref 40–54)
HDLC SERPL-MCNC: 48 MG/DL (ref 40–75)
HDLC SERPL: 21.2 % (ref 20–50)
HGB BLD-MCNC: 13.9 G/DL (ref 14–18)
IMM GRANULOCYTES # BLD AUTO: 0.01 K/UL (ref 0–0.04)
IMM GRANULOCYTES NFR BLD AUTO: 0.3 % (ref 0–0.5)
LDLC SERPL CALC-MCNC: 165.6 MG/DL (ref 63–159)
LYMPHOCYTES # BLD AUTO: 1.7 K/UL (ref 1–4.8)
LYMPHOCYTES NFR BLD: 45.2 % (ref 18–48)
MCH RBC QN AUTO: 29.2 PG (ref 27–31)
MCHC RBC AUTO-ENTMCNC: 32.8 G/DL (ref 32–36)
MCV RBC AUTO: 89 FL (ref 82–98)
MONOCYTES # BLD AUTO: 0.4 K/UL (ref 0.3–1)
MONOCYTES NFR BLD: 11.1 % (ref 4–15)
NEUTROPHILS # BLD AUTO: 1.5 K/UL (ref 1.8–7.7)
NEUTROPHILS NFR BLD: 39.2 % (ref 38–73)
NONHDLC SERPL-MCNC: 178 MG/DL
NRBC BLD-RTO: 0 /100 WBC
PLATELET # BLD AUTO: 297 K/UL (ref 150–450)
PMV BLD AUTO: 9.4 FL (ref 9.2–12.9)
POTASSIUM SERPL-SCNC: 3.5 MMOL/L (ref 3.5–5.1)
PROT SERPL-MCNC: 7 G/DL (ref 6–8.4)
RBC # BLD AUTO: 4.76 M/UL (ref 4.6–6.2)
SODIUM SERPL-SCNC: 139 MMOL/L (ref 136–145)
TRIGL SERPL-MCNC: 62 MG/DL (ref 30–150)
WBC # BLD AUTO: 3.78 K/UL (ref 3.9–12.7)

## 2023-05-12 PROCEDURE — 99396 PR PREVENTIVE VISIT,EST,40-64: ICD-10-PCS | Mod: S$GLB,,, | Performed by: INTERNAL MEDICINE

## 2023-05-12 PROCEDURE — 80061 LIPID PANEL: CPT | Performed by: INTERNAL MEDICINE

## 2023-05-12 PROCEDURE — 99999 PR PBB SHADOW E&M-EST. PATIENT-LVL III: CPT | Mod: PBBFAC,,, | Performed by: INTERNAL MEDICINE

## 2023-05-12 PROCEDURE — 83036 HEMOGLOBIN GLYCOSYLATED A1C: CPT | Performed by: INTERNAL MEDICINE

## 2023-05-12 PROCEDURE — 85025 COMPLETE CBC W/AUTO DIFF WBC: CPT | Performed by: INTERNAL MEDICINE

## 2023-05-12 PROCEDURE — 80053 COMPREHEN METABOLIC PANEL: CPT | Performed by: INTERNAL MEDICINE

## 2023-05-12 PROCEDURE — 99396 PREV VISIT EST AGE 40-64: CPT | Mod: S$GLB,,, | Performed by: INTERNAL MEDICINE

## 2023-05-12 PROCEDURE — 36415 COLL VENOUS BLD VENIPUNCTURE: CPT | Performed by: INTERNAL MEDICINE

## 2023-05-12 PROCEDURE — 99999 PR PBB SHADOW E&M-EST. PATIENT-LVL III: ICD-10-PCS | Mod: PBBFAC,,, | Performed by: INTERNAL MEDICINE

## 2023-05-12 PROCEDURE — 84153 ASSAY OF PSA TOTAL: CPT | Performed by: INTERNAL MEDICINE

## 2023-11-29 ENCOUNTER — OFFICE VISIT (OUTPATIENT)
Dept: INTERNAL MEDICINE | Facility: CLINIC | Age: 48
End: 2023-11-29
Payer: COMMERCIAL

## 2023-11-29 VITALS
BODY MASS INDEX: 27.81 KG/M2 | DIASTOLIC BLOOD PRESSURE: 70 MMHG | OXYGEN SATURATION: 98 % | WEIGHT: 194.25 LBS | SYSTOLIC BLOOD PRESSURE: 120 MMHG | HEIGHT: 70 IN | HEART RATE: 80 BPM

## 2023-11-29 DIAGNOSIS — E78.2 MODERATE MIXED HYPERLIPIDEMIA NOT REQUIRING STATIN THERAPY: ICD-10-CM

## 2023-11-29 DIAGNOSIS — E66.3 OVERWEIGHT (BMI 25.0-29.9): ICD-10-CM

## 2023-11-29 DIAGNOSIS — Z12.5 SCREENING FOR PROSTATE CANCER: ICD-10-CM

## 2023-11-29 DIAGNOSIS — R73.03 PREDIABETES: Primary | ICD-10-CM

## 2023-11-29 PROCEDURE — 99214 OFFICE O/P EST MOD 30 MIN: CPT | Mod: S$GLB,,, | Performed by: STUDENT IN AN ORGANIZED HEALTH CARE EDUCATION/TRAINING PROGRAM

## 2023-11-29 PROCEDURE — 99999 PR PBB SHADOW E&M-EST. PATIENT-LVL III: CPT | Mod: PBBFAC,,, | Performed by: STUDENT IN AN ORGANIZED HEALTH CARE EDUCATION/TRAINING PROGRAM

## 2023-11-29 PROCEDURE — 99999 PR PBB SHADOW E&M-EST. PATIENT-LVL III: ICD-10-PCS | Mod: PBBFAC,,, | Performed by: STUDENT IN AN ORGANIZED HEALTH CARE EDUCATION/TRAINING PROGRAM

## 2023-11-29 PROCEDURE — 99214 PR OFFICE/OUTPT VISIT, EST, LEVL IV, 30-39 MIN: ICD-10-PCS | Mod: S$GLB,,, | Performed by: STUDENT IN AN ORGANIZED HEALTH CARE EDUCATION/TRAINING PROGRAM

## 2023-11-29 NOTE — PROGRESS NOTES
[facility]    Subjective:         Patient ID: Mango Jaramillo Jr. is a 48 y.o. male.    Chief Complaint: Establish Care    History was obtained from the patient and supplemented through chart review.    HPI:  Patient is a 48 y.o. male who presents to [facility].  Patient has prediabetes and hyperlipidemia not requiring statin therapy.  He is slightly overweight.  He is no other chronic medical conditions and takes no medications.  Has no additional complaints today.  He is a nonsmoker, drinks alcohol on very rare occasion, estimates that he drinks only a few drinks per year.  Does not use drugs.  He stays active in his job doing  for [employer] however does not do dedicated exercise.  He states he intends to start regular daily exercise in addition to the physical activity he gets with his job in order to bring down his LDL, trees prediabetes and lose weight.  He also intends on adjusting his diet but eating less saturated fats and at sugars.  He recently obtained a heart rate monitor which he will use 2 monitor his exercise regimen.     He has a family history of heart disease.    Medical History  Past Medical History:   Diagnosis Date    Hyperlipidemia     Sciatic nerve pain diagnosed last year           Surgical hx, family hx, social hx   Family History   Problem Relation Age of Onset    Heart disease Father         CABG    Hearing loss Father     Heart disease Mother         CHF     Past Surgical History:   Procedure Laterality Date    APPENDECTOMY      COSMETIC SURGERY      HERNIA REPAIR      VASECTOMY       Social History     Socioeconomic History    Marital status:    Tobacco Use    Smoking status: Never    Smokeless tobacco: Never   Substance and Sexual Activity    Alcohol use: No    Drug use: No    Sexual activity: Yes     Partners: Female     Birth control/protection: Condom       Immunization History   Administered Date(s) Administered     "COVID-19 MRNA, LN-S PF (MODERNA HALF 0.25 ML DOSE) 11/02/2021    COVID-19, MRNA, LN-S, PF (MODERNA FULL 0.5 ML DOSE) 03/15/2021, 04/12/2021    COVID-19, mRNA, LNP-S, PF (Moderna 2023)Ages 12+ 09/21/2023    Influenza 11/07/2018, 11/20/2020    Influenza - Quadrivalent - MDCK - PF 09/21/2023    Influenza - Quadrivalent - PF *Preferred* (6 months and older) 10/07/2015, 11/15/2017, 11/20/2020, 11/01/2021    Tdap 03/03/2016       No current outpatient medications      Objective:        Body mass index is 27.87 kg/m².  Vitals:    11/29/23 0940   BP: 120/70   Pulse: 80   SpO2: 98%   Weight: 88.1 kg (194 lb 3.6 oz)   Height: 5' 10" (1.778 m)   PainSc: 0-No pain     Physical Exam  Constitutional:       General: He is not in acute distress.     Appearance: Normal appearance. He is not ill-appearing or toxic-appearing.   Eyes:      Extraocular Movements: Extraocular movements intact.   Cardiovascular:      Rate and Rhythm: Normal rate.      Pulses: Normal pulses.      Heart sounds: No murmur heard.  Pulmonary:      Effort: Pulmonary effort is normal.   Abdominal:      General: Abdomen is flat. There is no distension.   Musculoskeletal:      Cervical back: Neck supple. No tenderness.      Right lower leg: No edema.      Left lower leg: No edema.   Neurological:      Mental Status: He is alert.   Psychiatric:         Mood and Affect: Mood normal.           Lab Results   Component Value Date    WBC 3.78 (L) 05/12/2023    HGB 13.9 (L) 05/12/2023    HCT 42.4 05/12/2023     05/12/2023    CHOL 226 (H) 05/12/2023    TRIG 62 05/12/2023    HDL 48 05/12/2023    ALT 22 05/12/2023    AST 18 05/12/2023     05/12/2023    K 3.5 05/12/2023     05/12/2023    CREATININE 1.1 05/12/2023    BUN 12 05/12/2023    CO2 23 05/12/2023    TSH 1.578 03/03/2016    PSA 1.7 05/12/2023    HGBA1C 5.8 (H) 05/12/2023       The 10-year ASCVD risk score (Juan Miguel LEWIS, et al., 2019) is: 4.3%    Values used to calculate the score:      Age: 48 years    "   Sex: Male      Is Non- : Yes      Diabetic: No      Tobacco smoker: No      Systolic Blood Pressure: 120 mmHg      Is BP treated: No      HDL Cholesterol: 48 mg/dL      Total Cholesterol: 226 mg/dL    Assessment:         1. Prediabetes    2. Overweight (BMI 25.0-29.9)    3. Screening for prostate cancer    4. Moderate mixed hyperlipidemia not requiring statin therapy          Plan:   Patient doing well overall.  Plans to start exercise regimen and adjust his diet to bring down LDL and manage prediabetes.  Discussed aha recommendations for 30 minutes of moderate intensity exercise 5 times weekly.  Reviewed heart rate goals for exercise.  He is up-to-date on Cologuard which was done last year.  We had a risks/benefit discussion about prostate cancer screening.  He wishes to continue with this.  Reviewed his cholesterol and estimated ASCVD risk.  Discussed that his risk is below the threshold needed to start statin however if he was very concerned it would not be unreasonable to start this at this time.  He will continue with diet and exercise and follow up in 6 months.  He is up-to-date on this season's influenza and COVID-19 vaccinations.  Follow up in 6 months with labs prior    1. Prediabetes  - Hemoglobin A1C; Future  - Lipid Panel; Future    2. Overweight (BMI 25.0-29.9)  - CBC W/ AUTO DIFFERENTIAL; Future  - COMPREHENSIVE METABOLIC PANEL; Future    3. Screening for prostate cancer  - PSA, SCREENING; Future      All of your core healthy metrics are met.    Follow up in about 6 months (around 5/29/2024). or sooner mitchel Martínez  [facility]  [address] [place] [id number]  Phone [phone]  Fax [phone]    This note is dictated using the M*Modal Fluency Direct word recognition program. It may contain word recognition mistakes or wrong word substitutions that were missed on review.  I spent a total of 35 minutes on the day  98.6 of the visit.  This includes face to face time and non-face to face time preparing to see the patient (eg, review of tests), obtaining and/or reviewing separately obtained history, documenting clinical information in the electronic or other health record, independently interpreting results and communicating results to the patient/family/caregiver, or care coordinator.

## 2024-04-19 ENCOUNTER — PATIENT MESSAGE (OUTPATIENT)
Dept: INTERNAL MEDICINE | Facility: CLINIC | Age: 49
End: 2024-04-19
Payer: COMMERCIAL

## 2024-05-17 ENCOUNTER — PATIENT MESSAGE (OUTPATIENT)
Dept: INTERNAL MEDICINE | Facility: CLINIC | Age: 49
End: 2024-05-17

## 2024-05-17 ENCOUNTER — LAB VISIT (OUTPATIENT)
Dept: LAB | Facility: OTHER | Age: 49
End: 2024-05-17
Attending: STUDENT IN AN ORGANIZED HEALTH CARE EDUCATION/TRAINING PROGRAM
Payer: COMMERCIAL

## 2024-05-17 ENCOUNTER — OFFICE VISIT (OUTPATIENT)
Dept: INTERNAL MEDICINE | Facility: CLINIC | Age: 49
End: 2024-05-17
Payer: COMMERCIAL

## 2024-05-17 VITALS
HEIGHT: 70 IN | OXYGEN SATURATION: 97 % | BODY MASS INDEX: 26.33 KG/M2 | HEART RATE: 67 BPM | DIASTOLIC BLOOD PRESSURE: 76 MMHG | SYSTOLIC BLOOD PRESSURE: 115 MMHG | WEIGHT: 183.88 LBS

## 2024-05-17 DIAGNOSIS — E66.3 OVERWEIGHT (BMI 25.0-29.9): ICD-10-CM

## 2024-05-17 DIAGNOSIS — R73.03 PREDIABETES: ICD-10-CM

## 2024-05-17 DIAGNOSIS — Z00.00 ANNUAL PHYSICAL EXAM: Primary | ICD-10-CM

## 2024-05-17 DIAGNOSIS — Z12.5 SCREENING FOR PROSTATE CANCER: ICD-10-CM

## 2024-05-17 DIAGNOSIS — H90.3 SENSORINEURAL HEARING LOSS (SNHL) OF BOTH EARS: ICD-10-CM

## 2024-05-17 LAB
ALBUMIN SERPL BCP-MCNC: 3.8 G/DL (ref 3.5–5.2)
ALP SERPL-CCNC: 83 U/L (ref 55–135)
ALT SERPL W/O P-5'-P-CCNC: 26 U/L (ref 10–44)
ANION GAP SERPL CALC-SCNC: 7 MMOL/L (ref 8–16)
AST SERPL-CCNC: 19 U/L (ref 10–40)
BASOPHILS # BLD AUTO: 0.03 K/UL (ref 0–0.2)
BASOPHILS NFR BLD: 1 % (ref 0–1.9)
BILIRUB SERPL-MCNC: 0.6 MG/DL (ref 0.1–1)
BUN SERPL-MCNC: 12 MG/DL (ref 6–20)
CALCIUM SERPL-MCNC: 8.8 MG/DL (ref 8.7–10.5)
CHLORIDE SERPL-SCNC: 106 MMOL/L (ref 95–110)
CHOLEST SERPL-MCNC: 208 MG/DL (ref 120–199)
CHOLEST/HDLC SERPL: 4.2 {RATIO} (ref 2–5)
CO2 SERPL-SCNC: 26 MMOL/L (ref 23–29)
COMPLEXED PSA SERPL-MCNC: 2 NG/ML (ref 0–4)
CREAT SERPL-MCNC: 1 MG/DL (ref 0.5–1.4)
DIFFERENTIAL METHOD BLD: ABNORMAL
EOSINOPHIL # BLD AUTO: 0.1 K/UL (ref 0–0.5)
EOSINOPHIL NFR BLD: 4.2 % (ref 0–8)
ERYTHROCYTE [DISTWIDTH] IN BLOOD BY AUTOMATED COUNT: 12.8 % (ref 11.5–14.5)
EST. GFR  (NO RACE VARIABLE): >60 ML/MIN/1.73 M^2
ESTIMATED AVG GLUCOSE: 123 MG/DL (ref 68–131)
GLUCOSE SERPL-MCNC: 103 MG/DL (ref 70–110)
HBA1C MFR BLD: 5.9 % (ref 4–5.6)
HCT VFR BLD AUTO: 41.3 % (ref 40–54)
HDLC SERPL-MCNC: 50 MG/DL (ref 40–75)
HDLC SERPL: 24 % (ref 20–50)
HGB BLD-MCNC: 13.7 G/DL (ref 14–18)
IMM GRANULOCYTES # BLD AUTO: 0.01 K/UL (ref 0–0.04)
IMM GRANULOCYTES NFR BLD AUTO: 0.3 % (ref 0–0.5)
LDLC SERPL CALC-MCNC: 141 MG/DL (ref 63–159)
LYMPHOCYTES # BLD AUTO: 1.5 K/UL (ref 1–4.8)
LYMPHOCYTES NFR BLD: 47.1 % (ref 18–48)
MCH RBC QN AUTO: 29.5 PG (ref 27–31)
MCHC RBC AUTO-ENTMCNC: 33.2 G/DL (ref 32–36)
MCV RBC AUTO: 89 FL (ref 82–98)
MONOCYTES # BLD AUTO: 0.3 K/UL (ref 0.3–1)
MONOCYTES NFR BLD: 10.1 % (ref 4–15)
NEUTROPHILS # BLD AUTO: 1.2 K/UL (ref 1.8–7.7)
NEUTROPHILS NFR BLD: 37.3 % (ref 38–73)
NONHDLC SERPL-MCNC: 158 MG/DL
NRBC BLD-RTO: 0 /100 WBC
PLATELET # BLD AUTO: 285 K/UL (ref 150–450)
PMV BLD AUTO: 9.4 FL (ref 9.2–12.9)
POTASSIUM SERPL-SCNC: 4.3 MMOL/L (ref 3.5–5.1)
PROT SERPL-MCNC: 6.9 G/DL (ref 6–8.4)
RBC # BLD AUTO: 4.65 M/UL (ref 4.6–6.2)
SODIUM SERPL-SCNC: 139 MMOL/L (ref 136–145)
TRIGL SERPL-MCNC: 85 MG/DL (ref 30–150)
WBC # BLD AUTO: 3.08 K/UL (ref 3.9–12.7)

## 2024-05-17 PROCEDURE — 99999 PR PBB SHADOW E&M-EST. PATIENT-LVL III: CPT | Mod: PBBFAC,,, | Performed by: STUDENT IN AN ORGANIZED HEALTH CARE EDUCATION/TRAINING PROGRAM

## 2024-05-17 PROCEDURE — 80061 LIPID PANEL: CPT | Performed by: STUDENT IN AN ORGANIZED HEALTH CARE EDUCATION/TRAINING PROGRAM

## 2024-05-17 PROCEDURE — 83036 HEMOGLOBIN GLYCOSYLATED A1C: CPT | Performed by: STUDENT IN AN ORGANIZED HEALTH CARE EDUCATION/TRAINING PROGRAM

## 2024-05-17 PROCEDURE — 36415 COLL VENOUS BLD VENIPUNCTURE: CPT | Performed by: STUDENT IN AN ORGANIZED HEALTH CARE EDUCATION/TRAINING PROGRAM

## 2024-05-17 PROCEDURE — 85025 COMPLETE CBC W/AUTO DIFF WBC: CPT | Performed by: STUDENT IN AN ORGANIZED HEALTH CARE EDUCATION/TRAINING PROGRAM

## 2024-05-17 PROCEDURE — 80053 COMPREHEN METABOLIC PANEL: CPT | Performed by: STUDENT IN AN ORGANIZED HEALTH CARE EDUCATION/TRAINING PROGRAM

## 2024-05-17 PROCEDURE — 84153 ASSAY OF PSA TOTAL: CPT | Performed by: STUDENT IN AN ORGANIZED HEALTH CARE EDUCATION/TRAINING PROGRAM

## 2024-05-17 PROCEDURE — 99213 OFFICE O/P EST LOW 20 MIN: CPT | Mod: S$GLB,,, | Performed by: STUDENT IN AN ORGANIZED HEALTH CARE EDUCATION/TRAINING PROGRAM

## 2024-05-17 NOTE — PROGRESS NOTES
" [facility]  Subjective:     Patient ID: Mango Jaramillo Jr. is a 49 y.o. male.  Chief Complaint:  Annual exam/follow up of prediabetes.  HPI:  Patient is a 49 y.o. male who presents for the above chief complaint.     Doing well today.  Has no complaints.  Continues to work for [employer] and part-time at the [employer].  Has been trying to eat healthy.  Has been drinking smoothies.  Had labs drawn this morning but they are not yet back.    He was evaluated for hearing loss until an 18.  Was seen by audiology and subsequently otolaryngology.  Plan was for MRI brain however he did not have this done.  This was pre pandemic.  Has not follow up since.  Would like to be re-evaluated for hearing loss.     Currently is not take any medications    No current outpatient medications  Objective:      Body mass index is 26.38 kg/m².  Vitals:    05/17/24 0853   BP: 115/76   Pulse: 67   SpO2: 97%   Weight: 83.4 kg (183 lb 13.8 oz)   Height: 5' 10" (1.778 m)   PainSc: 0-No pain     Physical Exam  Constitutional:       Appearance: Normal appearance.   Cardiovascular:      Rate and Rhythm: Normal rate.      Heart sounds: No murmur heard.  Pulmonary:      Effort: Pulmonary effort is normal. No respiratory distress.      Breath sounds: No wheezing.   Musculoskeletal:      Right lower leg: No edema.      Left lower leg: No edema.   Skin:     General: Skin is warm and dry.   Neurological:      General: No focal deficit present.      Mental Status: He is alert.   Psychiatric:         Mood and Affect: Mood normal.           Assessment:       1. Annual physical exam    2. Sensorineural hearing loss (SNHL) of both ears        Plan:   Again discussed his ASCVD risk is low and does not yet require statin therapy.  Had labs drawn this morning A1c and lipid panel are pending.  I will be in contact via patient portal to discuss results.  So far does show CBC which has a mild neutropenia which is " stable as many years.  This is likely his baseline.  Has normal hematocrit with a borderline hemoglobin.  No signs of bleeding.  He was up-to-date on colorectal cancer screening.  Follow up in 1 year for annual.    Annual physical exam  -     PSA, SCREENING; Future; Expected date: 05/17/2024  -     COMPREHENSIVE METABOLIC PANEL; Future; Expected date: 11/17/2024  -     CBC W/ AUTO DIFFERENTIAL; Future; Expected date: 11/17/2024  -     LIPID PANEL; Future; Expected date: 11/17/2024  -     Hemoglobin A1C; Future; Expected date: 11/17/2024    Sensorineural hearing loss (SNHL) of both ears  -     Ambulatory referral/consult to Audiology; Future; Expected date: 05/24/2024        All of your core healthy metrics are met.    Follow up in about 1 year (around 5/17/2025). or sooner prn (as needed)          Elgin Martínez  [facility]  [address] [address]  Phone [phone]  Fax [phone]    This note is dictated using the M*Modal Fluency Direct word recognition program. It may contain word recognition mistakes or wrong word substitutions (commonly he/she and is/was substitutions) that were missed on review.

## 2024-06-27 ENCOUNTER — CLINICAL SUPPORT (OUTPATIENT)
Dept: AUDIOLOGY | Facility: CLINIC | Age: 49
End: 2024-06-27
Payer: COMMERCIAL

## 2024-06-27 DIAGNOSIS — H90.3 SENSORINEURAL HEARING LOSS (SNHL) OF BOTH EARS: ICD-10-CM

## 2024-06-27 PROCEDURE — 99999 PR PBB SHADOW E&M-EST. PATIENT-LVL II: CPT | Mod: PBBFAC,,, | Performed by: AUDIOLOGIST

## 2024-06-27 NOTE — PROGRESS NOTES
Mango Jaramillo JrBraulio, a 49 y.o. male, was seen today in the clinic for an audiologic evaluation.  Patient reported difficulty hearing he has noticed for several years. Patient reported when sounds are too soft he has difficulty hearing them. Patient reported a history of occupational noise exposure. Patient denied vertigo.    Tympanometry revealed Type A in the right ear and Type A in the left ear. Audiogram results revealed mild to moderate sensorineural hearing loss in the right ear and mild to moderately-severe sensorineural hearing loss in the left ear.  Speech reception thresholds were noted at 40 dB in the right ear and 35 dB in the left ear.  Speech discrimination scores were 100% in the right ear and 100% in the left ear.    Bilateral hearing aids were recommended. Patient should return for a hearing aid consult.    Recommendations:  Otologic evaluation  Annual audiogram  Hearing protection when in noise  Hearing aid consultation

## 2024-07-25 ENCOUNTER — CLINICAL SUPPORT (OUTPATIENT)
Dept: AUDIOLOGY | Facility: CLINIC | Age: 49
End: 2024-07-25
Payer: COMMERCIAL

## 2024-07-25 DIAGNOSIS — H90.3 SENSORINEURAL HEARING LOSS (SNHL) OF BOTH EARS: Primary | ICD-10-CM

## 2024-07-25 NOTE — PROGRESS NOTES
Mr. Mango Jaramillo was seen today for a hearing aid consultation. He has been having difficulty understanding parts of conversation when background noise is present as well as difficulty understanding individuals when they are whispering. Hearing aid options were presented and discussed along with the different levels of technology. He acknowledged understanding of the non-refundable $250 deposit, the 30 day trial period, and the fact we do not file insurance on behalf of the patient. Discussed the bundled versus intemized options, and Mr. Jaramillo would like to move forward with the itemized option. He acknowledged that after the 90-day period that he'd be responsible for payment of any services that are completed when he comes in for an appointment.     Mr. Jaramillo has not decided if he'd like to move forward with purchasing hearing aids, but he is interested in ReSound Nexia hearing aids in the advanced level of technology and color [place].  Mr. Jaramillo will call if he decides he'd like to purchase.

## 2024-10-20 ENCOUNTER — HOSPITAL ENCOUNTER (EMERGENCY)
Facility: HOSPITAL | Age: 49
Discharge: HOME | End: 2024-10-20
Attending: EMERGENCY MEDICINE
Payer: COMMERCIAL

## 2024-10-20 VITALS
BODY MASS INDEX: 26.26 KG/M2 | WEIGHT: 183 LBS | DIASTOLIC BLOOD PRESSURE: 82 MMHG | SYSTOLIC BLOOD PRESSURE: 117 MMHG | RESPIRATION RATE: 17 BRPM | OXYGEN SATURATION: 95 % | TEMPERATURE: 98 F | HEART RATE: 69 BPM

## 2024-10-20 DIAGNOSIS — R07.9 CHEST PAIN: ICD-10-CM

## 2024-10-20 LAB
ALBUMIN SERPL BCP-MCNC: 3.8 G/DL (ref 3.5–5.2)
ALP SERPL-CCNC: 84 U/L (ref 40–150)
ALT SERPL W/O P-5'-P-CCNC: 31 U/L (ref 10–44)
ANION GAP SERPL CALC-SCNC: 11 MMOL/L (ref 8–16)
AST SERPL-CCNC: 19 U/L (ref 10–40)
BASOPHILS # BLD AUTO: 0.04 K/UL (ref 0–0.2)
BASOPHILS NFR BLD: 0.9 % (ref 0–1.9)
BILIRUB SERPL-MCNC: 0.3 MG/DL (ref 0.1–1)
BNP SERPL-MCNC: 11 PG/ML (ref 0–99)
BUN SERPL-MCNC: 19 MG/DL (ref 6–20)
CALCIUM SERPL-MCNC: 9.1 MG/DL (ref 8.7–10.5)
CHLORIDE SERPL-SCNC: 107 MMOL/L (ref 95–110)
CO2 SERPL-SCNC: 21 MMOL/L (ref 23–29)
CREAT SERPL-MCNC: 1 MG/DL (ref 0.5–1.4)
DIFFERENTIAL METHOD BLD: ABNORMAL
EOSINOPHIL # BLD AUTO: 0.1 K/UL (ref 0–0.5)
EOSINOPHIL NFR BLD: 3.3 % (ref 0–8)
ERYTHROCYTE [DISTWIDTH] IN BLOOD BY AUTOMATED COUNT: 12.7 % (ref 11.5–14.5)
EST. GFR  (NO RACE VARIABLE): >60 ML/MIN/1.73 M^2
GLUCOSE SERPL-MCNC: 96 MG/DL (ref 70–110)
HCT VFR BLD AUTO: 40.2 % (ref 40–54)
HCV AB SERPL QL IA: NORMAL
HGB BLD-MCNC: 13.3 G/DL (ref 14–18)
HIV 1+2 AB+HIV1 P24 AG SERPL QL IA: NORMAL
IMM GRANULOCYTES # BLD AUTO: 0.01 K/UL (ref 0–0.04)
IMM GRANULOCYTES NFR BLD AUTO: 0.2 % (ref 0–0.5)
LYMPHOCYTES # BLD AUTO: 2 K/UL (ref 1–4.8)
LYMPHOCYTES NFR BLD: 47.2 % (ref 18–48)
MAGNESIUM SERPL-MCNC: 1.8 MG/DL (ref 1.6–2.6)
MCH RBC QN AUTO: 29 PG (ref 27–31)
MCHC RBC AUTO-ENTMCNC: 33.1 G/DL (ref 32–36)
MCV RBC AUTO: 88 FL (ref 82–98)
MONOCYTES # BLD AUTO: 0.4 K/UL (ref 0.3–1)
MONOCYTES NFR BLD: 8.2 % (ref 4–15)
NEUTROPHILS # BLD AUTO: 1.7 K/UL (ref 1.8–7.7)
NEUTROPHILS NFR BLD: 40.2 % (ref 38–73)
NRBC BLD-RTO: 0 /100 WBC
OHS QRS DURATION: 132 MS
OHS QTC CALCULATION: 403 MS
PLATELET # BLD AUTO: 284 K/UL (ref 150–450)
PMV BLD AUTO: 9.2 FL (ref 9.2–12.9)
POTASSIUM SERPL-SCNC: 4 MMOL/L (ref 3.5–5.1)
PROT SERPL-MCNC: 6.8 G/DL (ref 6–8.4)
RBC # BLD AUTO: 4.59 M/UL (ref 4.6–6.2)
SODIUM SERPL-SCNC: 139 MMOL/L (ref 136–145)
TROPONIN I SERPL DL<=0.01 NG/ML-MCNC: <0.006 NG/ML (ref 0–0.03)
TROPONIN I SERPL DL<=0.01 NG/ML-MCNC: <0.006 NG/ML (ref 0–0.03)
WBC # BLD AUTO: 4.26 K/UL (ref 3.9–12.7)

## 2024-10-20 PROCEDURE — 94761 N-INVAS EAR/PLS OXIMETRY MLT: CPT

## 2024-10-20 PROCEDURE — 93010 ELECTROCARDIOGRAM REPORT: CPT | Mod: ,,, | Performed by: INTERNAL MEDICINE

## 2024-10-20 PROCEDURE — 85025 COMPLETE CBC W/AUTO DIFF WBC: CPT | Performed by: STUDENT IN AN ORGANIZED HEALTH CARE EDUCATION/TRAINING PROGRAM

## 2024-10-20 PROCEDURE — 93005 ELECTROCARDIOGRAM TRACING: CPT

## 2024-10-20 PROCEDURE — 84484 ASSAY OF TROPONIN QUANT: CPT | Mod: 91 | Performed by: EMERGENCY MEDICINE

## 2024-10-20 PROCEDURE — 83880 ASSAY OF NATRIURETIC PEPTIDE: CPT | Performed by: STUDENT IN AN ORGANIZED HEALTH CARE EDUCATION/TRAINING PROGRAM

## 2024-10-20 PROCEDURE — 80053 COMPREHEN METABOLIC PANEL: CPT | Performed by: STUDENT IN AN ORGANIZED HEALTH CARE EDUCATION/TRAINING PROGRAM

## 2024-10-20 PROCEDURE — 84484 ASSAY OF TROPONIN QUANT: CPT | Performed by: STUDENT IN AN ORGANIZED HEALTH CARE EDUCATION/TRAINING PROGRAM

## 2024-10-20 PROCEDURE — 83735 ASSAY OF MAGNESIUM: CPT | Performed by: STUDENT IN AN ORGANIZED HEALTH CARE EDUCATION/TRAINING PROGRAM

## 2024-10-20 PROCEDURE — 87389 HIV-1 AG W/HIV-1&-2 AB AG IA: CPT | Performed by: PHYSICIAN ASSISTANT

## 2024-10-20 PROCEDURE — 99285 EMERGENCY DEPT VISIT HI MDM: CPT | Mod: 25

## 2024-10-20 PROCEDURE — 86803 HEPATITIS C AB TEST: CPT | Performed by: PHYSICIAN ASSISTANT

## 2024-10-20 RX ORDER — KETOROLAC TROMETHAMINE 30 MG/ML
15 INJECTION, SOLUTION INTRAMUSCULAR; INTRAVENOUS
Status: DISCONTINUED | OUTPATIENT
Start: 2024-10-20 | End: 2024-10-20 | Stop reason: HOSPADM

## 2024-10-20 NOTE — ED PROVIDER NOTES
Encounter Date: 10/20/2024       History     Chief Complaint   Patient presents with    Chest Pain     Intermittent midsternal chest pain/tightness that started Tuesday. Does not radiate.      HPI    49-year-old male with significant medical history of hyperlipidemia, prediabetes, presenting for chest pain x5 days.     Patient presents for chest pain that began at 2:00 a.m., 2 hours prior to arrival.  It woke him up out of his sleep.  He endorses 9/10 inferior anterior chest pressure, and ache.  The pain has progressively improved since the pain began, he endorses 7/10 to 5/10 in the emergency department on arrival.  He denies associated shortness of breath, fever, chills, nausea, vomiting, abdominal pain.  He was taken no meds prior to arrival.    He had a repeat episode of chest pain that started 5 days prior, the pain rapidly improved that same day, and has not returned until this morning.    He recently went to [place] and flew home yesterday.  He denies smoking, endorses his dad has a history of heart disease, is not sure what kind.  He also has aunt and uncles with heart disease, also unsure of what kind.      Review of patient's allergies indicates:  No Known Allergies  Past Medical History:   Diagnosis Date    Hyperlipidemia     Sciatic nerve pain diagnosed last year     Past Surgical History:   Procedure Laterality Date    APPENDECTOMY      COSMETIC SURGERY      HERNIA REPAIR      VASECTOMY       Family History   Problem Relation Name Age of Onset    Heart disease Father Mango Jaramillo Sr         CABG    Hearing loss Father Mango Jaramillo Sr     Heart disease Mother Latisha Jaramillo         CHF     Social History     Tobacco Use    Smoking status: Never    Smokeless tobacco: Never   Substance Use Topics    Alcohol use: No    Drug use: No     Review of Systems  See HPI for pertinent ROS.   Physical Exam     Initial Vitals [10/20/24 0447]   BP Pulse Resp Temp SpO2   129/89 68 18 97.9 °F (36.6 °C) 97 %       MAP       --         Physical Exam    Constitutional: He appears well-developed and well-nourished.   HENT:   Head: Normocephalic and atraumatic.   Eyes: Pupils are equal, round, and reactive to light. No scleral icterus.   Neck: No JVD present.   Normal range of motion.  Cardiovascular:  Normal rate and regular rhythm.     Exam reveals no gallop and no friction rub.       No murmur heard.  Pulmonary/Chest: Breath sounds normal. No stridor. He has no wheezes. He has no rhonchi. He has no rales.   Abdominal: Abdomen is soft. There is no abdominal tenderness. There is no rebound and no guarding.   Musculoskeletal:         General: No tenderness (no TTP of the anteior chest wall) or edema.      Cervical back: Normal range of motion.     Neurological: He is alert. GCS score is 15. GCS eye subscore is 4. GCS verbal subscore is 5. GCS motor subscore is 6.   Skin: Skin is warm. Capillary refill takes less than 2 seconds.   Psychiatric: He has a normal mood and affect.         ED Course   Procedures  Labs Reviewed   CBC W/ AUTO DIFFERENTIAL - Abnormal       Result Value    WBC 4.26      RBC 4.59 (*)     Hemoglobin 13.3 (*)     Hematocrit 40.2      MCV 88      MCH 29.0      MCHC 33.1      RDW 12.7      Platelets 284      MPV 9.2      Immature Granulocytes 0.2      Gran # (ANC) 1.7 (*)     Immature Grans (Abs) 0.01      Lymph # 2.0      Mono # 0.4      Eos # 0.1      Baso # 0.04      nRBC 0      Gran % 40.2      Lymph % 47.2      Mono % 8.2      Eosinophil % 3.3      Basophil % 0.9      Differential Method Automated      Narrative:     Release to patient->Immediate   HIV 1 / 2 ANTIBODY   HEPATITIS C ANTIBODY   COMPREHENSIVE METABOLIC PANEL   TROPONIN I   B-TYPE NATRIURETIC PEPTIDE   MAGNESIUM          Imaging Results              X-Ray Chest AP Portable (Final result)  Result time 10/20/24 05:51:04      Final result by Elier Sanchez MD (10/20/24 05:51:04)                   Impression:      No acute findings.    No  significant change from prior study.      Electronically signed by: Elier Sanchez MD  Date:    10/20/2024  Time:    05:51               Narrative:    EXAMINATION:  XR CHEST AP PORTABLE    CLINICAL HISTORY:  Chest pain, unspecified    TECHNIQUE:  Single frontal view of the chest was performed.    COMPARISON:  02/26/2010.    FINDINGS:  Heart and lungs  appear unchanged when allowing for differences in technique and positioning.                                       Medications   ketorolac injection 15 mg (0 mg Intravenous Hold 10/20/24 0515)     Medical Decision Making            Attending Attestation:   Physician Attestation Statement for Resident:  As the supervising MD   Physician Attestation Statement: I have personally seen and examined this patient.   I agree with the above history.  -:   As the supervising MD I agree with the above PE.     As the supervising MD I agree with the above treatment, course, plan, and disposition.    I have reviewed and agree with the residents interpretation of the following: lab data, x-rays and EKG.  I have reviewed the following: old records at this facility.                ED Course as of 10/20/24 0556   Sun Oct 20, 2024   0539 CBC auto differential(!)  No evidence of leukocytosis, acute anemia requiring transfusion or thrombocytopenia.   [KB]   0560 X-Ray Chest AP Portable  On my personal interpretation of this CXR, there is no evidence of acute pneumonia, pneumothorax, pleural effusion, or pulmonary edema.    [KB]      ED Course User Index  [KB] Karyn Millan MD                       EKG, normal sinus rhythm, rate 65, normal axis deviation, right bundle-branch block, no QTC prolongation or ST segment elevation or STEMI equivalent.    49-year-old male described as above presenting for acute onset chest pain that woke him up out of his sleep 2 hours prior to arrival.  On arrival, vital signs stable, he was afebrile and overall appears well.  Physical exam overall reassuring,  no new murmur, lungs clear to auscultation, chest pain not reproducible with exam.  EKG without evidence of STEMI or STEMI equivalent.  There is a right bundle-branch block, however on evaluation of prior, right bundle-branch block was present before and in similar morphology, making acute right heart strain less likely.    See ED course for personal interpretation of results and differential diagnosis.    He was signed out to oncoming team pending delta troponin for chest pain evaluation with dispo per sign-out team.    Clinical Impression:  Final diagnoses:  [R07.9] Chest pain                 Karyn Millan MD  Resident  10/20/24 5956       Brianna Cartwright MD  10/20/24 1216

## 2024-10-20 NOTE — ED NOTES
Received report from Andrew. Assumed care of patient at this time. Pt placed in hospital gown and currently lying in stretcher. Vital signs are stable, provided pt with warm blanket. Pt denied restroom use. No other complaints from pt at this time. Care ongoing.    LOC: The patient is awake, alert and aware of environment with an appropriate affect, the patient is oriented x 3 and speaking appropriately.   APPEARANCE: Patient appears comfortable and in no acute distress, patient is clean and well groomed.  SKIN: The skin is warm and dry, color consistent with ethnicity, patient has normal skin turgor and moist mucus membranes, skin intact, no breakdown or bruising noted.   MUSCULOSKELETAL: Patient moving all extremities spontaneously, no swelling noted.  RESPIRATORY: Airway is open and patent, respirations are spontaneous, patient has a normal effort and rate, no accessory muscle.  CARDIAC: Pt placed on cardiac monitor. Patient has a normal rate and regular rhythm, no edema noted, capillary refill < 3 seconds.   GASTRO: Soft and non tender to palpation, no distention noted.  : Pt denies any pain or frequency with urination.  NEURO: Pt opens eyes spontaneously, behavior appropriate to situation, follows commands, facial expression symmetrical, bilateral hand grasp equal and even, purposeful motor response noted, normal sensation in all extremities when touched with a finger.

## 2024-10-20 NOTE — Clinical Note
"Mango Gomes" Ella was seen and treated in our emergency department on 10/20/2024.  He may return to work on 10/21/2024.       If you have any questions or concerns, please don't hesitate to call.      Sari Patiño RN RN    "

## 2024-10-20 NOTE — PROVIDER PROGRESS NOTES - EMERGENCY DEPT.
Encounter Date: 10/20/2024    ED Physician Progress Notes       Patient was signed out to me by Dr. Millan pending 2nd troponin with likely discharge if this is normal.  In brief the patient was here for neck pain, chest pain workup initiated due to family history.  The patient's 2nd troponin was normal, his chest pain has also resolved without intervention.  He had been offered Toradol but refused.  He feels he would like to be discharged.  I discussed return precautions with them and answered all of his questions.  I provided him with discharge paperwork and he was discharged in stable condition.

## 2024-10-20 NOTE — ED TRIAGE NOTES
Mango Jaramillo Jr., a 49 y.o. male presents to the ED w/ complaints of mid sternal chest pain/ tightness that started Tuesday, pain does not radiate, - cardiac hx.     Triage note:  Chief Complaint   Patient presents with    Chest Pain     Intermittent midsternal chest pain/tightness that started Tuesday. Does not radiate.      Review of patient's allergies indicates:  No Known Allergies  Past Medical History:   Diagnosis Date    Hyperlipidemia     Sciatic nerve pain diagnosed last year

## 2024-10-20 NOTE — ED NOTES
APPEARANCE: awake and alert in NAD.  SKIN: warm, dry and intact. No breakdown or bruising.  MUSCULOSKELETAL: Patient moving all extremities spontaneously, no obvious swelling or deformities noted. Ambulates independently.  RESPIRATORY: Denies shortness of breath.Respirations unlabored.   CARDIAC: + mid sternal chest pain, 2+ distal pulses; no peripheral edema  ABDOMEN: S/ND/NT, Denies nausea,   : voids spontaneously, denies difficulty  Neurologic: AAO x 4; follows commands equal strength in all extremities; denies numbness/tingling. Denies dizziness, VANESA, pupils 3mm

## 2025-05-02 ENCOUNTER — PATIENT MESSAGE (OUTPATIENT)
Dept: INTERNAL MEDICINE | Facility: CLINIC | Age: 50
End: 2025-05-02
Payer: COMMERCIAL

## 2025-05-02 DIAGNOSIS — Z00.00 ANNUAL PHYSICAL EXAM: ICD-10-CM

## 2025-05-05 NOTE — TELEPHONE ENCOUNTER
Hello,  I only signed a TSH because you have previous labs ordered last May for A1C, CBC, CMP, PSA, and Lipid which expires in July/2025. Do you need to add anything else?

## 2025-05-09 ENCOUNTER — LAB VISIT (OUTPATIENT)
Dept: LAB | Facility: OTHER | Age: 50
End: 2025-05-09
Attending: STUDENT IN AN ORGANIZED HEALTH CARE EDUCATION/TRAINING PROGRAM
Payer: COMMERCIAL

## 2025-05-09 DIAGNOSIS — Z00.00 ANNUAL PHYSICAL EXAM: ICD-10-CM

## 2025-05-09 LAB
ABSOLUTE EOSINOPHIL (OHS): 0.12 K/UL
ABSOLUTE MONOCYTE (OHS): 0.26 K/UL (ref 0.3–1)
ABSOLUTE NEUTROPHIL COUNT (OHS): 0.99 K/UL (ref 1.8–7.7)
ALBUMIN SERPL BCP-MCNC: 4.3 G/DL (ref 3.5–5.2)
ALP SERPL-CCNC: 83 UNIT/L (ref 40–150)
ALT SERPL W/O P-5'-P-CCNC: 27 UNIT/L (ref 10–44)
ANION GAP (OHS): 6 MMOL/L (ref 8–16)
AST SERPL-CCNC: 20 UNIT/L (ref 11–45)
BASOPHILS # BLD AUTO: 0.03 K/UL
BASOPHILS NFR BLD AUTO: 1 %
BILIRUB SERPL-MCNC: 0.8 MG/DL (ref 0.1–1)
BUN SERPL-MCNC: 9 MG/DL (ref 6–20)
CALCIUM SERPL-MCNC: 9.4 MG/DL (ref 8.7–10.5)
CHLORIDE SERPL-SCNC: 105 MMOL/L (ref 95–110)
CHOLEST SERPL-MCNC: 223 MG/DL (ref 120–199)
CHOLEST/HDLC SERPL: 3.5 {RATIO} (ref 2–5)
CO2 SERPL-SCNC: 29 MMOL/L (ref 23–29)
CREAT SERPL-MCNC: 0.9 MG/DL (ref 0.5–1.4)
EAG (OHS): 123 MG/DL (ref 68–131)
ERYTHROCYTE [DISTWIDTH] IN BLOOD BY AUTOMATED COUNT: 12.6 % (ref 11.5–14.5)
GFR SERPLBLD CREATININE-BSD FMLA CKD-EPI: >60 ML/MIN/1.73/M2
GLUCOSE SERPL-MCNC: 88 MG/DL (ref 70–110)
HBA1C MFR BLD: 5.9 % (ref 4–5.6)
HCT VFR BLD AUTO: 42 % (ref 40–54)
HDLC SERPL-MCNC: 63 MG/DL (ref 40–75)
HDLC SERPL: 28.3 % (ref 20–50)
HGB BLD-MCNC: 14 GM/DL (ref 14–18)
IMM GRANULOCYTES # BLD AUTO: 0.01 K/UL (ref 0–0.04)
IMM GRANULOCYTES NFR BLD AUTO: 0.3 % (ref 0–0.5)
LDLC SERPL CALC-MCNC: 142.8 MG/DL (ref 63–159)
LYMPHOCYTES # BLD AUTO: 1.69 K/UL (ref 1–4.8)
MCH RBC QN AUTO: 29.3 PG (ref 27–31)
MCHC RBC AUTO-ENTMCNC: 33.3 G/DL (ref 32–36)
MCV RBC AUTO: 88 FL (ref 82–98)
NONHDLC SERPL-MCNC: 160 MG/DL
NUCLEATED RBC (/100WBC) (OHS): 0 /100 WBC
PLATELET # BLD AUTO: 283 K/UL (ref 150–450)
PMV BLD AUTO: 9.4 FL (ref 9.2–12.9)
POTASSIUM SERPL-SCNC: 4.2 MMOL/L (ref 3.5–5.1)
PROT SERPL-MCNC: 7.7 GM/DL (ref 6–8.4)
PSA SERPL-MCNC: 2.13 NG/ML
RBC # BLD AUTO: 4.78 M/UL (ref 4.6–6.2)
RELATIVE EOSINOPHIL (OHS): 3.9 %
RELATIVE LYMPHOCYTE (OHS): 54.5 % (ref 18–48)
RELATIVE MONOCYTE (OHS): 8.4 % (ref 4–15)
RELATIVE NEUTROPHIL (OHS): 31.9 % (ref 38–73)
SODIUM SERPL-SCNC: 140 MMOL/L (ref 136–145)
TRIGL SERPL-MCNC: 86 MG/DL (ref 30–150)
TSH SERPL-ACNC: 1.14 UIU/ML (ref 0.4–4)
WBC # BLD AUTO: 3.1 K/UL (ref 3.9–12.7)

## 2025-05-09 PROCEDURE — 80061 LIPID PANEL: CPT

## 2025-05-09 PROCEDURE — 85025 COMPLETE CBC W/AUTO DIFF WBC: CPT

## 2025-05-09 PROCEDURE — 84153 ASSAY OF PSA TOTAL: CPT

## 2025-05-09 PROCEDURE — 84443 ASSAY THYROID STIM HORMONE: CPT

## 2025-05-09 PROCEDURE — 82040 ASSAY OF SERUM ALBUMIN: CPT

## 2025-05-09 PROCEDURE — 36415 COLL VENOUS BLD VENIPUNCTURE: CPT

## 2025-05-09 PROCEDURE — 83036 HEMOGLOBIN GLYCOSYLATED A1C: CPT

## 2025-05-12 ENCOUNTER — RESULTS FOLLOW-UP (OUTPATIENT)
Dept: INTERNAL MEDICINE | Facility: CLINIC | Age: 50
End: 2025-05-12

## 2025-05-23 ENCOUNTER — OFFICE VISIT (OUTPATIENT)
Dept: INTERNAL MEDICINE | Facility: CLINIC | Age: 50
End: 2025-05-23
Payer: COMMERCIAL

## 2025-05-23 VITALS
BODY MASS INDEX: 27.24 KG/M2 | DIASTOLIC BLOOD PRESSURE: 70 MMHG | SYSTOLIC BLOOD PRESSURE: 103 MMHG | HEART RATE: 61 BPM | OXYGEN SATURATION: 97 % | HEIGHT: 70 IN | WEIGHT: 190.25 LBS

## 2025-05-23 DIAGNOSIS — E78.2 MODERATE MIXED HYPERLIPIDEMIA NOT REQUIRING STATIN THERAPY: ICD-10-CM

## 2025-05-23 DIAGNOSIS — R73.03 PREDIABETES: ICD-10-CM

## 2025-05-23 DIAGNOSIS — Z12.11 SCREENING FOR COLON CANCER: Primary | ICD-10-CM

## 2025-05-23 PROCEDURE — 99999 PR PBB SHADOW E&M-EST. PATIENT-LVL III: CPT | Mod: PBBFAC,,, | Performed by: STUDENT IN AN ORGANIZED HEALTH CARE EDUCATION/TRAINING PROGRAM

## 2025-05-23 RX ORDER — ROSUVASTATIN CALCIUM 5 MG/1
5 TABLET, COATED ORAL DAILY
Qty: 90 TABLET | Refills: 3 | Status: SHIPPED | OUTPATIENT
Start: 2025-05-23 | End: 2026-05-23

## 2025-05-23 NOTE — PROGRESS NOTES
"[facility]    Subjective:    Patient ID: Mango Jaramillo Jr. is a 50 y.o. male.    History of Present Illness    CHIEF COMPLAINT:  Patient presents today for annual exam    LABS:  LDL cholesterol is around 140, improved from previous level of 160. Total cholesterol remains slightly elevated, though HDL has improved. Pre-diabetes remains stable with no worsening noted. All other lab results, including thyroid function, kidney function, liver function tests, and PSA are normal.    DIET AND EXERCISE:  He consumes 1-2 sugary soft drinks daily with intermittent periods of abstinence. He remains physically active through two jobs that require being on feet throughout the day. Works for [employer]. He denies routine exercise outside of work due to busy schedule, though expresses interest in starting.    FAMILY HISTORY:  Father had a stroke several years ago.       Current Outpatient Medications   Medication Instructions    rosuvastatin (CRESTOR) 5 mg, Oral, Daily       Objective:      Body mass index is 27.3 kg/m².  Vitals:    05/23/25 1136   BP: 103/70   Pulse: 61   SpO2: 97%   Weight: 86.3 kg (190 lb 4.1 oz)   Height: 5' 10" (1.778 m)   PainSc: 0-No pain     Physical Exam   Physical Exam    General: No acute distress. Normal appearance.  Head: Normocephalic.  Eyes: Extraocular movements intact.  Neck: No thyromegaly.  Cardiovascular: Normal rate and rhythm. No murmur heard.  Lungs: Pulmonary effort is normal. Normal breath sounds. No wheezing. No rales.  Abdomen: Flat.  Musculoskeletal: No edema lower extremities.  Skin: Warm. Dry.  Neurological: Alert.  Psychiatric: Normal mood. Normal behavior.  Vitals: Weight: 190 lbs.       Assessment:      1. Screening for colon cancer    2. Moderate mixed hyperlipidemia not requiring statin therapy    3. Prediabetes             Plan (dictated):   Patient seen today for annual exam.   He had labs prior which were all essentially stable.   He " continues to have a mild elevation of A1C to 5.9. Upon review of lifestyle habits, he notes that he drinks sugary soft drinks with relative frequency about one a day. I advised he cut back and only drink these on sparingly if at all to reduce the amount of added sugar in his diet.   He has also gained a few lbs since last visit. He will work on improving his diet overall to facilitate weight loss.   He continues to have a moderate elevation of cholesterol. He does have a family history of stroke in his father. He is interested in statin therapy to reduce lifetime risk and improve hyperlipidemia.   He will return for labs in three months to assess for improvement in A1C and cholesterol with lifestyle change and medication.   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  Otherwise follow up annually with me.        Plan continued (generated by ambient listening):  Assessment & Plan      PLAN SUMMARY:  Prescribed rosuvastatin 5 mg daily for cholesterol management  Ordered Cologuard test for colorectal cancer screening  Recommend reducing sugary soft drinks and considering lower calorie alternatives  Advised improving overall diet for weight loss, target weight 175-180 lbs  Ordered follow-up labs in 3-4 months: lipid panel, A1C, and CMP  Annual visits for routine care thereafter    HYPERLIPIDEMIA:  Monitored LDL cholesterol level which is stable at 140, the best it's been in 2 years.  Total cholesterol is slightly elevated, but HDL is up.  Prescribed rosuvastatin 5 mg daily for its efficacy in reducing cholesterol by approximately 45% with minimal side effects.  Educated the patient on the effectiveness of statins in reducing lifetime risk and the minor association between statins and diabetes development, emphasizing that overall benefit outweighs risk.  Ordered follow-up labs in 3-4 months including lipid panel, A1C, and CMP to assess improvement with lifestyle changes and  "medication.    PREDIABETES:  Documented mild elevation of A1C (5.9).    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  Informed patient about this condition and its minimal impact on health.    WEIGHT MANAGEMENT AND NUTRITION:  Discussed the concept of "never drink a calorie" for weight management  Recommend reducing sugary soft drinks to special occasions only and considering lower calorie alternatives with less sugar.  Advised improving overall diet to facilitate weight loss with a target weight closer to 175-180 lbs.    COLORECTAL CANCER SCREENING:  Ordered Cologuard test for colorectal cancer screening.      Diagnoses and associated orders:   Screening for colon cancer  -     Cologuard Screening (Multitarget Stool DNA); Future; Expected date: 05/23/2025    Moderate mixed hyperlipidemia not requiring statin therapy  -     rosuvastatin (CRESTOR) 5 MG tablet; Take 1 tablet (5 mg total) by mouth once daily.  Dispense: 90 tablet; Refill: 3  -     Lipid Panel; Future; Expected date: 08/23/2025  -     Comprehensive Metabolic Panel; Future; Expected date: 08/23/2025    Prediabetes  -     Hemoglobin A1C; Future; Expected date: 08/23/2025  -     Comprehensive Metabolic Panel; Future; Expected date: 08/23/2025        Tests to Keep You Healthy    Colon Cancer Screening: ORDERED    Follow up in about 1 year (around 5/23/2026). or sooner prn (as needed)          Elgin Martínez  [facility]  [facility] [address] [address]  Phone [phone]  Fax [phone]    Part of this note is dictated using the M*Modal Fluency Direct word recognition program. It may contain word recognition mistakes or wrong word substitutions (commonly he/she and is/was substitutions) that were missed on review.    Part of this note was generated with the assistance of ambient listening technology. Verbal consent was obtained by the patient and accompanying " visitor(s) for the recording of patient appointment to facilitate this note. I attest to having reviewed and edited the generated note for accuracy, though some syntax or spelling errors may persist. Please contact the author of this note for any clarification.  The 10-year ASCVD risk score (Juan Miguel LEWIS, et al., 2019) is: 3.3%    Values used to calculate the score:      Age: 50 years      Sex: Male      Is Non- : Yes      Diabetic: No      Tobacco smoker: No      Systolic Blood Pressure: 103 mmHg      Is BP treated: No      HDL Cholesterol: 63 mg/dL      Total Cholesterol: 223 mg/dL

## 2025-08-19 ENCOUNTER — TELEPHONE (OUTPATIENT)
Dept: PODIATRY | Facility: CLINIC | Age: 50
End: 2025-08-19
Payer: COMMERCIAL

## 2025-08-22 ENCOUNTER — OFFICE VISIT (OUTPATIENT)
Dept: PODIATRY | Facility: CLINIC | Age: 50
End: 2025-08-22
Payer: COMMERCIAL

## 2025-08-22 ENCOUNTER — HOSPITAL ENCOUNTER (OUTPATIENT)
Dept: RADIOLOGY | Facility: HOSPITAL | Age: 50
Discharge: HOME | End: 2025-08-22
Attending: STUDENT IN AN ORGANIZED HEALTH CARE EDUCATION/TRAINING PROGRAM
Payer: COMMERCIAL

## 2025-08-22 ENCOUNTER — RESULTS FOLLOW-UP (OUTPATIENT)
Dept: PODIATRY | Facility: CLINIC | Age: 50
End: 2025-08-22

## 2025-08-22 ENCOUNTER — TELEPHONE (OUTPATIENT)
Dept: PODIATRY | Facility: CLINIC | Age: 50
End: 2025-08-22
Payer: COMMERCIAL

## 2025-08-22 VITALS
HEIGHT: 70 IN | HEART RATE: 61 BPM | SYSTOLIC BLOOD PRESSURE: 116 MMHG | DIASTOLIC BLOOD PRESSURE: 68 MMHG | BODY MASS INDEX: 27.24 KG/M2 | WEIGHT: 190.25 LBS

## 2025-08-22 DIAGNOSIS — M21.612 BUNION, LEFT FOOT: ICD-10-CM

## 2025-08-22 DIAGNOSIS — S99.922A INJURY OF LEFT FOOT, INITIAL ENCOUNTER: ICD-10-CM

## 2025-08-22 DIAGNOSIS — M79.672 PAIN IN LEFT FOOT: ICD-10-CM

## 2025-08-22 DIAGNOSIS — M79.89 SWELLING OF LEFT FOOT: ICD-10-CM

## 2025-08-22 DIAGNOSIS — M79.672 PAIN IN LEFT FOOT: Primary | ICD-10-CM

## 2025-08-22 PROCEDURE — 99999 PR PBB SHADOW E&M-EST. PATIENT-LVL III: CPT | Mod: PBBFAC,,, | Performed by: STUDENT IN AN ORGANIZED HEALTH CARE EDUCATION/TRAINING PROGRAM

## 2025-08-22 PROCEDURE — 99214 OFFICE O/P EST MOD 30 MIN: CPT | Mod: S$GLB,,, | Performed by: STUDENT IN AN ORGANIZED HEALTH CARE EDUCATION/TRAINING PROGRAM

## 2025-08-22 PROCEDURE — 73630 X-RAY EXAM OF FOOT: CPT | Mod: TC,LT

## 2025-08-22 PROCEDURE — 73630 X-RAY EXAM OF FOOT: CPT | Mod: 26,LT,, | Performed by: RADIOLOGY

## 2025-08-22 RX ORDER — DEXAMETHASONE 4 MG/1
4 TABLET ORAL DAILY
Qty: 14 TABLET | Refills: 0 | Status: SHIPPED | OUTPATIENT
Start: 2025-08-22

## 2025-08-22 RX ORDER — TRAMADOL HYDROCHLORIDE 50 MG/1
50 TABLET, FILM COATED ORAL EVERY 6 HOURS PRN
Qty: 28 TABLET | Refills: 0 | Status: SHIPPED | OUTPATIENT
Start: 2025-08-22

## 2025-08-29 ENCOUNTER — TELEPHONE (OUTPATIENT)
Dept: PODIATRY | Facility: CLINIC | Age: 50
End: 2025-08-29
Payer: COMMERCIAL